# Patient Record
Sex: MALE | Race: WHITE | NOT HISPANIC OR LATINO | Employment: FULL TIME | URBAN - METROPOLITAN AREA
[De-identification: names, ages, dates, MRNs, and addresses within clinical notes are randomized per-mention and may not be internally consistent; named-entity substitution may affect disease eponyms.]

---

## 2019-01-06 ENCOUNTER — OFFICE VISIT (OUTPATIENT)
Dept: URGENT CARE | Facility: CLINIC | Age: 49
End: 2019-01-06
Payer: COMMERCIAL

## 2019-01-06 VITALS
HEART RATE: 77 BPM | OXYGEN SATURATION: 100 % | SYSTOLIC BLOOD PRESSURE: 114 MMHG | DIASTOLIC BLOOD PRESSURE: 60 MMHG | HEIGHT: 74 IN | RESPIRATION RATE: 18 BRPM | TEMPERATURE: 98.4 F | BODY MASS INDEX: 27.08 KG/M2 | WEIGHT: 211 LBS

## 2019-01-06 DIAGNOSIS — R13.10 DYSPHAGIA, UNSPECIFIED TYPE: ICD-10-CM

## 2019-01-06 DIAGNOSIS — M54.2 NECK PAIN: ICD-10-CM

## 2019-01-06 PROCEDURE — 99205 OFFICE O/P NEW HI 60 MIN: CPT | Performed by: PHYSICIAN ASSISTANT

## 2019-01-06 RX ORDER — NAPROXEN 500 MG/1
500 TABLET ORAL 2 TIMES DAILY WITH MEALS
Qty: 30 TAB | Refills: 0 | Status: ON HOLD | OUTPATIENT
Start: 2019-01-06 | End: 2019-01-09

## 2019-01-06 RX ORDER — KETOROLAC TROMETHAMINE 30 MG/ML
60 INJECTION, SOLUTION INTRAMUSCULAR; INTRAVENOUS ONCE
Status: COMPLETED | OUTPATIENT
Start: 2019-01-06 | End: 2019-01-06

## 2019-01-06 RX ORDER — CYCLOBENZAPRINE HCL 10 MG
10 TABLET ORAL 3 TIMES DAILY PRN
Qty: 30 TAB | Refills: 0 | Status: SHIPPED | OUTPATIENT
Start: 2019-01-06 | End: 2019-12-24

## 2019-01-06 RX ORDER — ACETAMINOPHEN 325 MG/1
650 TABLET ORAL EVERY 4 HOURS PRN
COMMUNITY
End: 2019-01-07

## 2019-01-06 RX ADMIN — KETOROLAC TROMETHAMINE 60 MG: 30 INJECTION, SOLUTION INTRAMUSCULAR; INTRAVENOUS at 12:21

## 2019-01-06 ASSESSMENT — ENCOUNTER SYMPTOMS
DIZZINESS: 0
PARESIS: 0
MYALGIAS: 1
DIARRHEA: 0
WEIGHT LOSS: 0
SORE THROAT: 0
FEVER: 0
NUMBNESS: 0
SENSORY CHANGE: 0
VOMITING: 0
PALPITATIONS: 0
COUGH: 0
BACK PAIN: 0
NECK PAIN: 1
CHILLS: 0
FALLS: 0
WEAKNESS: 0
SHORTNESS OF BREATH: 0
SPUTUM PRODUCTION: 0
LEG PAIN: 0
TROUBLE SWALLOWING: 0
FOCAL WEAKNESS: 0
TINGLING: 0
VISUAL CHANGE: 0
SINUS PAIN: 0
HEADACHES: 0
NAUSEA: 0
SYNCOPE: 0
ABDOMINAL PAIN: 0
WHEEZING: 0

## 2019-01-06 NOTE — PROGRESS NOTES
Subjective:      Jaquan Fernandez is a 48 y.o. male who presents with Neck Pain (denies injury, woke up with stiffness 3 days ago, worse today)            Neck Pain    This is a new problem. Episode onset: 3 days ago  The problem occurs constantly. The problem has been unchanged. The pain is associated with nothing (Patient denies any injury. ). The quality of the pain is described as aching. Pain severity now: moderate-severe. The symptoms are aggravated by swallowing, bending, position and twisting (any movement ). The pain is worse during the night. Associated symptoms include pain with swallowing. Pertinent negatives include no chest pain, fever, headaches, leg pain, numbness, paresis, syncope, tingling, trouble swallowing, visual change, weakness or weight loss. He has tried NSAIDs and acetaminophen (one muscle relaxant last night ) for the symptoms. Improvement on treatment: minimal.       No past medical history on file.    No past surgical history on file.    No family history on file.    No Known Allergies    Medications, Allergies, and current problem list reviewed today in Epic      Review of Systems   Constitutional: Negative for chills, fever, malaise/fatigue and weight loss.   HENT: Negative for congestion, ear discharge, ear pain, sinus pain, sore throat and trouble swallowing.         Pain with swallowing    Respiratory: Negative for cough, sputum production, shortness of breath and wheezing.    Cardiovascular: Negative for chest pain, palpitations, leg swelling and syncope.   Gastrointestinal: Negative for abdominal pain, diarrhea, nausea and vomiting.   Musculoskeletal: Positive for myalgias and neck pain. Negative for back pain and falls.   Neurological: Negative for dizziness, tingling, sensory change, focal weakness, weakness, numbness and headaches.     All other systems reviewed and are negative.        Objective:     /60 (BP Location: Left arm, Patient Position: Sitting)   Pulse 77   Temp  "36.9 °C (98.4 °F) (Temporal)   Resp 18   Ht 1.88 m (6' 2\")   Wt 95.7 kg (211 lb)   SpO2 100%   BMI 27.09 kg/m²      Physical Exam   Constitutional: He is oriented to person, place, and time. He appears well-developed and well-nourished. He appears distressed (patient appears to be in pain ).   HENT:   Head: Normocephalic and atraumatic.   Right Ear: Tympanic membrane, external ear and ear canal normal.   Left Ear: Tympanic membrane, external ear and ear canal normal.   Nose: Nose normal.   Mouth/Throat: Uvula is midline, oropharynx is clear and moist and mucous membranes are normal. No tonsillar exudate.   Eyes: Conjunctivae are normal.   Neck: Neck supple.   Cardiovascular: Normal rate, regular rhythm and normal heart sounds.  Exam reveals no gallop and no friction rub.    No murmur heard.  Pulmonary/Chest: Effort normal and breath sounds normal. No respiratory distress. He has no decreased breath sounds. He has no wheezes. He has no rhonchi. He has no rales.   Lymphadenopathy:     He has no cervical adenopathy.   Neurological: He is alert and oriented to person, place, and time. No cranial nerve deficit.   Skin: Skin is warm and dry. No rash noted.   Psychiatric: He has a normal mood and affect. His behavior is normal. Judgment and thought content normal.         CT C-SPINE  Impression       No acute fracture cervical spine.  Multilevel degenerative changes. Moderate right C3-4 neural foraminal stenosis.  Soft tissue calcifications anterior to the dens and caudad to the anterior mass of C1 as well as prevertebral soft tissue thickening. This may be related to inflammatory changes. Recommend consideration MRI for further evaluation.          Assessment/Plan:     1. Neck pain  ketorolac (TORADOL) injection 60 mg    naproxen (NAPROSYN) 500 MG Tab    cyclobenzaprine (FLEXERIL) 10 MG Tab    CT-CSPINE WITHOUT PLUS RECONS   2. Dysphagia, unspecified type          PATIENT SEEN ON 1/6/19 with 3 day onset of acute " severe neck pain and pain with swallowing. Patient did have neck TTP on exam which was thought to be possibly musculoskeletal in origin. Discussed case with Colleague, DR. Conley. We ordered a CT of the c-spine due to symptoms of pain with swallowing.  CT showing signs of possible RETROPHARYNGEAL Abscess. Patient is doing worse today. He is in severe pain and is having more difficulty with swallowing. Due to patient's worsening condition, Recommended ER evaluation for higher level of care.  Patient agrees to drives directly  to main [facility] for further evaluation.     Discussed case with Dr. Salinas at [facility] who kindly accepted the patient.     Nori Durham P.A.-C.

## 2019-01-07 ENCOUNTER — HOSPITAL ENCOUNTER (OUTPATIENT)
Dept: RADIOLOGY | Facility: MEDICAL CENTER | Age: 49
End: 2019-01-07
Attending: PHYSICIAN ASSISTANT
Payer: COMMERCIAL

## 2019-01-07 ENCOUNTER — HOSPITAL ENCOUNTER (INPATIENT)
Facility: MEDICAL CENTER | Age: 49
LOS: 2 days | DRG: 552 | End: 2019-01-09
Attending: EMERGENCY MEDICINE | Admitting: INTERNAL MEDICINE
Payer: COMMERCIAL

## 2019-01-07 ENCOUNTER — APPOINTMENT (OUTPATIENT)
Dept: RADIOLOGY | Facility: MEDICAL CENTER | Age: 49
DRG: 552 | End: 2019-01-07
Attending: EMERGENCY MEDICINE
Payer: COMMERCIAL

## 2019-01-07 DIAGNOSIS — J39.0 RETROPHARYNGEAL ABSCESS: ICD-10-CM

## 2019-01-07 DIAGNOSIS — M54.2 NECK PAIN: ICD-10-CM

## 2019-01-07 PROBLEM — R13.10 ODYNOPHAGIA: Status: ACTIVE | Noted: 2019-01-07

## 2019-01-07 LAB
ALBUMIN SERPL BCP-MCNC: 4.2 G/DL (ref 3.2–4.9)
ALBUMIN/GLOB SERPL: 1.2 G/DL
ALP SERPL-CCNC: 73 U/L (ref 30–99)
ALT SERPL-CCNC: 14 U/L (ref 2–50)
ANION GAP SERPL CALC-SCNC: 10 MMOL/L (ref 0–11.9)
APTT PPP: 29.1 SEC (ref 24.7–36)
AST SERPL-CCNC: 11 U/L (ref 12–45)
BASOPHILS # BLD AUTO: 0.6 % (ref 0–1.8)
BASOPHILS # BLD: 0.08 K/UL (ref 0–0.12)
BILIRUB SERPL-MCNC: 0.4 MG/DL (ref 0.1–1.5)
BUN SERPL-MCNC: 13 MG/DL (ref 8–22)
CALCIUM SERPL-MCNC: 9.7 MG/DL (ref 8.5–10.5)
CHLORIDE SERPL-SCNC: 103 MMOL/L (ref 96–112)
CO2 SERPL-SCNC: 27 MMOL/L (ref 20–33)
CREAT SERPL-MCNC: 0.79 MG/DL (ref 0.5–1.4)
EOSINOPHIL # BLD AUTO: 0.12 K/UL (ref 0–0.51)
EOSINOPHIL NFR BLD: 0.8 % (ref 0–6.9)
ERYTHROCYTE [DISTWIDTH] IN BLOOD BY AUTOMATED COUNT: 42.6 FL (ref 35.9–50)
FLUAV RNA SPEC QL NAA+PROBE: NEGATIVE
FLUBV RNA SPEC QL NAA+PROBE: NEGATIVE
GLOBULIN SER CALC-MCNC: 3.4 G/DL (ref 1.9–3.5)
GLUCOSE SERPL-MCNC: 78 MG/DL (ref 65–99)
HCT VFR BLD AUTO: 42.7 % (ref 42–52)
HGB BLD-MCNC: 14.2 G/DL (ref 14–18)
IMM GRANULOCYTES # BLD AUTO: 0.04 K/UL (ref 0–0.11)
IMM GRANULOCYTES NFR BLD AUTO: 0.3 % (ref 0–0.9)
INR PPP: 1.02 (ref 0.87–1.13)
LYMPHOCYTES # BLD AUTO: 2.73 K/UL (ref 1–4.8)
LYMPHOCYTES NFR BLD: 19.3 % (ref 22–41)
MCH RBC QN AUTO: 29.8 PG (ref 27–33)
MCHC RBC AUTO-ENTMCNC: 33.3 G/DL (ref 33.7–35.3)
MCV RBC AUTO: 89.7 FL (ref 81.4–97.8)
MONOCYTES # BLD AUTO: 1 K/UL (ref 0–0.85)
MONOCYTES NFR BLD AUTO: 7.1 % (ref 0–13.4)
NEUTROPHILS # BLD AUTO: 10.21 K/UL (ref 1.82–7.42)
NEUTROPHILS NFR BLD: 71.9 % (ref 44–72)
NRBC # BLD AUTO: 0 K/UL
NRBC BLD-RTO: 0 /100 WBC
PLATELET # BLD AUTO: 338 K/UL (ref 164–446)
PMV BLD AUTO: 9.6 FL (ref 9–12.9)
POTASSIUM SERPL-SCNC: 4.2 MMOL/L (ref 3.6–5.5)
PROT SERPL-MCNC: 7.6 G/DL (ref 6–8.2)
PROTHROMBIN TIME: 13.6 SEC (ref 12–14.6)
RBC # BLD AUTO: 4.76 M/UL (ref 4.7–6.1)
S PYO AG THROAT QL: NORMAL
SIGNIFICANT IND 70042: NORMAL
SITE SITE: NORMAL
SODIUM SERPL-SCNC: 140 MMOL/L (ref 135–145)
SOURCE SOURCE: NORMAL
WBC # BLD AUTO: 14.2 K/UL (ref 4.8–10.8)

## 2019-01-07 PROCEDURE — 99223 1ST HOSP IP/OBS HIGH 75: CPT | Mod: GC | Performed by: INTERNAL MEDICINE

## 2019-01-07 PROCEDURE — 700117 HCHG RX CONTRAST REV CODE 255: Performed by: EMERGENCY MEDICINE

## 2019-01-07 PROCEDURE — 700111 HCHG RX REV CODE 636 W/ 250 OVERRIDE (IP): Performed by: EMERGENCY MEDICINE

## 2019-01-07 PROCEDURE — 99285 EMERGENCY DEPT VISIT HI MDM: CPT

## 2019-01-07 PROCEDURE — 700111 HCHG RX REV CODE 636 W/ 250 OVERRIDE (IP): Performed by: STUDENT IN AN ORGANIZED HEALTH CARE EDUCATION/TRAINING PROGRAM

## 2019-01-07 PROCEDURE — 85730 THROMBOPLASTIN TIME PARTIAL: CPT

## 2019-01-07 PROCEDURE — 85025 COMPLETE CBC W/AUTO DIFF WBC: CPT

## 2019-01-07 PROCEDURE — 70491 CT SOFT TISSUE NECK W/DYE: CPT

## 2019-01-07 PROCEDURE — 87081 CULTURE SCREEN ONLY: CPT

## 2019-01-07 PROCEDURE — 87040 BLOOD CULTURE FOR BACTERIA: CPT | Mod: 91

## 2019-01-07 PROCEDURE — 96375 TX/PRO/DX INJ NEW DRUG ADDON: CPT

## 2019-01-07 PROCEDURE — 87502 INFLUENZA DNA AMP PROBE: CPT

## 2019-01-07 PROCEDURE — 72125 CT NECK SPINE W/O DYE: CPT

## 2019-01-07 PROCEDURE — 96365 THER/PROPH/DIAG IV INF INIT: CPT

## 2019-01-07 PROCEDURE — 80053 COMPREHEN METABOLIC PANEL: CPT

## 2019-01-07 PROCEDURE — 96366 THER/PROPH/DIAG IV INF ADDON: CPT

## 2019-01-07 PROCEDURE — 770001 HCHG ROOM/CARE - MED/SURG/GYN PRIV*

## 2019-01-07 PROCEDURE — 36415 COLL VENOUS BLD VENIPUNCTURE: CPT

## 2019-01-07 PROCEDURE — 87880 STREP A ASSAY W/OPTIC: CPT

## 2019-01-07 PROCEDURE — 96367 TX/PROPH/DG ADDL SEQ IV INF: CPT

## 2019-01-07 PROCEDURE — 700105 HCHG RX REV CODE 258: Performed by: STUDENT IN AN ORGANIZED HEALTH CARE EDUCATION/TRAINING PROGRAM

## 2019-01-07 PROCEDURE — 94760 N-INVAS EAR/PLS OXIMETRY 1: CPT

## 2019-01-07 PROCEDURE — 700105 HCHG RX REV CODE 258: Performed by: EMERGENCY MEDICINE

## 2019-01-07 PROCEDURE — 85610 PROTHROMBIN TIME: CPT

## 2019-01-07 RX ORDER — CLINDAMYCIN PHOSPHATE 600 MG/50ML
600 INJECTION, SOLUTION INTRAVENOUS ONCE
Status: DISCONTINUED | OUTPATIENT
Start: 2019-01-07 | End: 2019-01-07

## 2019-01-07 RX ORDER — SODIUM CHLORIDE 9 MG/ML
INJECTION, SOLUTION INTRAVENOUS CONTINUOUS
Status: DISCONTINUED | OUTPATIENT
Start: 2019-01-07 | End: 2019-01-09 | Stop reason: HOSPADM

## 2019-01-07 RX ORDER — ONDANSETRON 2 MG/ML
4 INJECTION INTRAMUSCULAR; INTRAVENOUS ONCE
Status: COMPLETED | OUTPATIENT
Start: 2019-01-07 | End: 2019-01-07

## 2019-01-07 RX ORDER — AMOXICILLIN 250 MG
2 CAPSULE ORAL 2 TIMES DAILY
Status: DISCONTINUED | OUTPATIENT
Start: 2019-01-07 | End: 2019-01-09 | Stop reason: HOSPADM

## 2019-01-07 RX ORDER — COVID-19 ANTIGEN TEST
2 KIT MISCELLANEOUS
Status: ON HOLD | COMMUNITY
End: 2019-01-09

## 2019-01-07 RX ORDER — SODIUM CHLORIDE 9 MG/ML
1000 INJECTION, SOLUTION INTRAVENOUS ONCE
Status: COMPLETED | OUTPATIENT
Start: 2019-01-07 | End: 2019-01-07

## 2019-01-07 RX ORDER — MORPHINE SULFATE 4 MG/ML
4 INJECTION, SOLUTION INTRAMUSCULAR; INTRAVENOUS ONCE
Status: COMPLETED | OUTPATIENT
Start: 2019-01-07 | End: 2019-01-07

## 2019-01-07 RX ORDER — MORPHINE SULFATE 4 MG/ML
4 INJECTION, SOLUTION INTRAMUSCULAR; INTRAVENOUS
Status: DISCONTINUED | OUTPATIENT
Start: 2019-01-07 | End: 2019-01-08

## 2019-01-07 RX ORDER — POLYETHYLENE GLYCOL 3350 17 G/17G
1 POWDER, FOR SOLUTION ORAL
Status: DISCONTINUED | OUTPATIENT
Start: 2019-01-07 | End: 2019-01-09 | Stop reason: HOSPADM

## 2019-01-07 RX ORDER — IBUPROFEN 200 MG
400 TABLET ORAL
COMMUNITY

## 2019-01-07 RX ORDER — BISACODYL 10 MG
10 SUPPOSITORY, RECTAL RECTAL
Status: DISCONTINUED | OUTPATIENT
Start: 2019-01-07 | End: 2019-01-09 | Stop reason: HOSPADM

## 2019-01-07 RX ORDER — MORPHINE SULFATE 4 MG/ML
2 INJECTION, SOLUTION INTRAMUSCULAR; INTRAVENOUS
Status: DISCONTINUED | OUTPATIENT
Start: 2019-01-07 | End: 2019-01-07

## 2019-01-07 RX ORDER — DEXAMETHASONE SODIUM PHOSPHATE 4 MG/ML
4 INJECTION, SOLUTION INTRA-ARTICULAR; INTRALESIONAL; INTRAMUSCULAR; INTRAVENOUS; SOFT TISSUE EVERY 6 HOURS
Status: DISCONTINUED | OUTPATIENT
Start: 2019-01-07 | End: 2019-01-08

## 2019-01-07 RX ADMIN — SODIUM CHLORIDE 3 G: 900 INJECTION INTRAVENOUS at 22:22

## 2019-01-07 RX ADMIN — VANCOMYCIN HYDROCHLORIDE 2400 MG: 100 INJECTION, POWDER, LYOPHILIZED, FOR SOLUTION INTRAVENOUS at 17:06

## 2019-01-07 RX ADMIN — DEXAMETHASONE SODIUM PHOSPHATE 4 MG: 4 INJECTION, SOLUTION INTRA-ARTICULAR; INTRALESIONAL; INTRAMUSCULAR; INTRAVENOUS; SOFT TISSUE at 22:11

## 2019-01-07 RX ADMIN — SODIUM CHLORIDE 3 G: 900 INJECTION INTRAVENOUS at 16:29

## 2019-01-07 RX ADMIN — SODIUM CHLORIDE 1000 ML: 9 INJECTION, SOLUTION INTRAVENOUS at 15:10

## 2019-01-07 RX ADMIN — ONDANSETRON 4 MG: 2 INJECTION INTRAMUSCULAR; INTRAVENOUS at 15:10

## 2019-01-07 RX ADMIN — IOHEXOL 80 ML: 350 INJECTION, SOLUTION INTRAVENOUS at 15:41

## 2019-01-07 RX ADMIN — MORPHINE SULFATE 2 MG: 4 INJECTION INTRAVENOUS at 21:53

## 2019-01-07 RX ADMIN — MORPHINE SULFATE 4 MG: 4 INJECTION INTRAVENOUS at 15:10

## 2019-01-07 RX ADMIN — SODIUM CHLORIDE: 9 INJECTION, SOLUTION INTRAVENOUS at 22:07

## 2019-01-07 ASSESSMENT — ENCOUNTER SYMPTOMS
MYALGIAS: 0
DEPRESSION: 0
PALPITATIONS: 0
BLURRED VISION: 0
COUGH: 0
CHILLS: 0
HEMOPTYSIS: 0
NAUSEA: 0
NECK PAIN: 1
DOUBLE VISION: 0
HEARTBURN: 0
DIZZINESS: 0
FEVER: 0
HEADACHES: 0
SORE THROAT: 0

## 2019-01-07 ASSESSMENT — COPD QUESTIONNAIRES
DO YOU EVER COUGH UP ANY MUCUS OR PHLEGM?: NO/ONLY WITH OCCASIONAL COLDS OR INFECTIONS
DURING THE PAST 4 WEEKS HOW MUCH DID YOU FEEL SHORT OF BREATH: NONE/LITTLE OF THE TIME
COPD SCREENING SCORE: 0
HAVE YOU SMOKED AT LEAST 100 CIGARETTES IN YOUR ENTIRE LIFE: NO/DON'T KNOW

## 2019-01-07 ASSESSMENT — LIFESTYLE VARIABLES: EVER_SMOKED: NEVER

## 2019-01-07 ASSESSMENT — PAIN SCALES - GENERAL
PAINLEVEL_OUTOF10: 10
PAINLEVEL_OUTOF10: 6

## 2019-01-07 NOTE — ED TRIAGE NOTES
Pt to triage .  Chief Complaint   Patient presents with   • Sent by MD     pt was seen at u/c yesterday had a CT this am and called by radiologist and told to come to ED for further evaluation    • Neck Pain     increasing over last 5 days   • Difficulty Swallowing

## 2019-01-07 NOTE — ED PROVIDER NOTES
ED Provider Note    Scribed for Murali Willson M.D. by Zenoiba Rose. 1/7/2019  2:29 PM    Primary care provider: Marshall Jimenez M.D.  Means of arrival: Walk in   History obtained from: Patient  History limited by: None    CHIEF COMPLAINT  Chief Complaint   Patient presents with   • Sent by MD     pt was seen at / yesterday had a CT this am and called by radiologist and told to come to ED for further evaluation    • Neck Pain     increasing over last 5 days   • Difficulty Swallowing       HPI  Jaquan Fernandez is a 48 y.o. male who presents to the Emergency Department complaining of neck stiffness onset 4 days ago with worsening symptoms. He reports the stiffness initially began in his shoulders and is now radiating to the base of his skull. Patient also states having associated difficulty swallowing. Patient presented to Urgent Care this morning and was sent to this ED with concerns for . He denies shortness of breath, sore throat, fever, chills, numbness, or tingling.     REVIEW OF SYSTEMS  Pertinent negatives include no shortness of breath, sore throat, fever, chills, numbness, tingling.   As above, all other systems reviewed and are negative.   See HPI for further details.     PAST MEDICAL HISTORY  Negative for history of sepsis or immunosuppression.     SURGICAL HISTORY  patient denies any surgical history    SOCIAL HISTORY  Social History   Substance Use Topics   • Smoking status: Never Smoker   • Smokeless tobacco: Never Used   • Alcohol use Yes      History   Drug Use No       FAMILY HISTORY  History reviewed. No pertinent family history.    CURRENT MEDICATIONS  Home Medications     Reviewed by Lilli Yang (Pharmacy Tech) on 01/07/19 at 1530  Med List Status: Complete   Medication Last Dose Status   Acetaminophen (TYLENOL PO) 1/6/2019 Active   cyclobenzaprine (FLEXERIL) 10 MG Tab 1/7/2019 Active   ibuprofen (MOTRIN) 200 MG Tab 1/6/2019 Active   naproxen (NAPROSYN) 500 MG Tab 1/7/2019 Active  "  Naproxen Sodium (ALEVE) 220 MG Cap 1/6/2019 Active                ALLERGIES  No Known Allergies    PHYSICAL EXAM  VITAL SIGNS: /89   Pulse 78   Temp 36.4 °C (97.6 °F) (Temporal)   Resp 16   Ht 1.88 m (6' 2\")   Wt 95.3 kg (210 lb 1.6 oz)   SpO2 98%   BMI 26.98 kg/m²     Constitutional: Well developed, Well nourished, No acute distress, Non-toxic appearance.   HENT: Normocephalic, Atraumatic, Bilateral external ears normal, Oropharynx is clear, Dry mucous membranes. No oral exudates or nasal discharge. No uvular deviation or peritonsillar swelling noted.    Eyes: Pupils are equal round and reactive, EOMI, Conjunctiva normal, No discharge.   Neck: Normal range of motion, No tenderness, Supple, No stridor. No meningismus.  Lymphatic: No lymphadenopathy noted.   Cardiovascular: Regular rate and rhythm without murmur rub or gallop.  Thorax & Lungs: Ketotic breath, Clear breath sounds, No wheezes, rhonchi or rales. There is no chest wall tenderness.   Abdomen: Soft non-tender non-distended. There is no rebound or guarding. No organomegaly is appreciated. Bowel sounds are normal.  Skin: Normal without rash.   Back: No CVA or spinal tenderness.   Extremities: Intact distal pulses, No edema, No tenderness, No cyanosis, No clubbing. Capillary refill is less than 2 seconds.  Musculoskeletal: Good range of motion in all major joints. No tenderness to palpation or major deformities noted.   Neurologic: Alert & oriented x 3, Normal motor function, Normal sensory function, No focal deficits noted. Reflexes are normal.  Psychiatric: Affect normal, Judgment normal, Mood normal. There is no suicidal ideation or patient reported hallucinations.     DIAGNOSTIC STUDIES / PROCEDURES    LABS  Labs Reviewed   CBC WITH DIFFERENTIAL - Abnormal; Notable for the following:        Result Value    WBC 14.2 (*)     MCHC 33.3 (*)     Lymphocytes 19.30 (*)     Neutrophils (Absolute) 10.21 (*)     Monos (Absolute) 1.00 (*)     All " other components within normal limits    Narrative:     Indicate which anticoagulants the patient is on:->NONE   COMP METABOLIC PANEL - Abnormal; Notable for the following:     AST(SGOT) 11 (*)     All other components within normal limits    Narrative:     Indicate which anticoagulants the patient is on:->NONE   PROTHROMBIN TIME    Narrative:     Indicate which anticoagulants the patient is on:->NONE   APTT    Narrative:     Indicate which anticoagulants the patient is on:->NONE   ESTIMATED GFR    Narrative:     Indicate which anticoagulants the patient is on:->NONE   RAPID STREP,CULT IF INDICATED   All labs reviewed by me.    RADIOLOGY  CT-SOFT TISSUE NECK WITH   Final Result      Diffuse edema within the retropharyngeal space extending from C2 to the upper thoracic spine, most concerning for inflammatory process and developing abscess.      These findings were discussed with BLANK SANON on 1/7/2019 3:49 PM.         The radiologist's interpretation of all radiological studies have been reviewed by me.    COURSE & MEDICAL DECISION MAKING  Nursing notes, VS, PMSFHx reviewed in chart.    Review of past medical records shows the patient was seen in Urgent Care and underwent CT C spine after presenting with difficulty swallowing.     2:29 PM Patient seen and examined at bedside. I have a high suspicion for a retropharyngeal abscess that is unable to be evaluation with the previous CT scan completed this morning. Low concerns for C spine process given that he does not have any neurological changes. Ordered for CT soft tissue neck and labs to evaluate. Patient was treated with 4 mg Zofran and 4 mg morphine for his symptoms. He will be given IV fluids secondary to dehydration. Patient was not given oral fluids as he is having difficulty swallowing.    Patient has leukocytosis at 14,000 with lymphocytic shift and no evidence of electrolyte derangements, acidosis, renal or liver dysfunction.  Coagulation studies are  normal.    3:49 PM Consulted radiology who states patient had a retropharyngeal phelgmon and is concerned it may progress to an abscess. Patient will be admitted for further treatment.     3:52 PM Paged [facility].     3:59 PM Patient reevaluated at bedside. He is resting in bed. Patient is showing good response to rehydration with IV fluids. Discussed results as shown above with patient and family member. They were informed of of further plan of care including hospital admission. Patient will be treated with 2,400 mg vancomycin in  mL and 3 g Unasyn in  mL.     4:14 PM Consulted [facility], who agreed to admit patient.  They will obtain ENT consultation on a nonemergent basis    DISPOSITION:  Patient will be admitted to [facility] in guarded condition.     FINAL IMPRESSION  1. Retropharyngeal abscess       Zenobia BERNSTEIN (Avril), am scribing for, and in the presence of, Murali Willson M.D..  Electronically signed by: Zenobia Rose (Avril), 1/7/2019  Murali BERNSTEIN M.D. personally performed the services described in this documentation, as scribed by Zenobia Rose in my presence, and it is both accurate and complete. C.     The note accurately reflects work and decisions made by me.  Murali Willson  1/7/2019  4:22 PM

## 2019-01-08 LAB
ALBUMIN SERPL BCP-MCNC: 4 G/DL (ref 3.2–4.9)
ALBUMIN/GLOB SERPL: 1.4 G/DL
ALP SERPL-CCNC: 73 U/L (ref 30–99)
ALT SERPL-CCNC: 9 U/L (ref 2–50)
ANION GAP SERPL CALC-SCNC: 11 MMOL/L (ref 0–11.9)
AST SERPL-CCNC: 10 U/L (ref 12–45)
BASOPHILS # BLD AUTO: 0.2 % (ref 0–1.8)
BASOPHILS # BLD: 0.03 K/UL (ref 0–0.12)
BILIRUB SERPL-MCNC: 0.5 MG/DL (ref 0.1–1.5)
BUN SERPL-MCNC: 13 MG/DL (ref 8–22)
CALCIUM SERPL-MCNC: 9.3 MG/DL (ref 8.5–10.5)
CHLORIDE SERPL-SCNC: 106 MMOL/L (ref 96–112)
CO2 SERPL-SCNC: 23 MMOL/L (ref 20–33)
CREAT SERPL-MCNC: 0.69 MG/DL (ref 0.5–1.4)
EOSINOPHIL # BLD AUTO: 0.01 K/UL (ref 0–0.51)
EOSINOPHIL NFR BLD: 0.1 % (ref 0–6.9)
ERYTHROCYTE [DISTWIDTH] IN BLOOD BY AUTOMATED COUNT: 42.8 FL (ref 35.9–50)
GLOBULIN SER CALC-MCNC: 2.9 G/DL (ref 1.9–3.5)
GLUCOSE SERPL-MCNC: 103 MG/DL (ref 65–99)
HCT VFR BLD AUTO: 40.5 % (ref 42–52)
HGB BLD-MCNC: 13.3 G/DL (ref 14–18)
IMM GRANULOCYTES # BLD AUTO: 0.06 K/UL (ref 0–0.11)
IMM GRANULOCYTES NFR BLD AUTO: 0.4 % (ref 0–0.9)
LYMPHOCYTES # BLD AUTO: 1.2 K/UL (ref 1–4.8)
LYMPHOCYTES NFR BLD: 8.4 % (ref 22–41)
MCH RBC QN AUTO: 29.8 PG (ref 27–33)
MCHC RBC AUTO-ENTMCNC: 32.8 G/DL (ref 33.7–35.3)
MCV RBC AUTO: 90.8 FL (ref 81.4–97.8)
MONOCYTES # BLD AUTO: 0.31 K/UL (ref 0–0.85)
MONOCYTES NFR BLD AUTO: 2.2 % (ref 0–13.4)
NEUTROPHILS # BLD AUTO: 12.75 K/UL (ref 1.82–7.42)
NEUTROPHILS NFR BLD: 88.7 % (ref 44–72)
NRBC # BLD AUTO: 0 K/UL
NRBC BLD-RTO: 0 /100 WBC
PLATELET # BLD AUTO: 321 K/UL (ref 164–446)
PMV BLD AUTO: 10.2 FL (ref 9–12.9)
POTASSIUM SERPL-SCNC: 4.2 MMOL/L (ref 3.6–5.5)
PROT SERPL-MCNC: 6.9 G/DL (ref 6–8.2)
RBC # BLD AUTO: 4.46 M/UL (ref 4.7–6.1)
SODIUM SERPL-SCNC: 140 MMOL/L (ref 135–145)
VANCOMYCIN TROUGH SERPL-MCNC: 10.6 UG/ML (ref 10–20)
WBC # BLD AUTO: 14.4 K/UL (ref 4.8–10.8)

## 2019-01-08 PROCEDURE — 700102 HCHG RX REV CODE 250 W/ 637 OVERRIDE(OP): Performed by: STUDENT IN AN ORGANIZED HEALTH CARE EDUCATION/TRAINING PROGRAM

## 2019-01-08 PROCEDURE — 700105 HCHG RX REV CODE 258: Performed by: STUDENT IN AN ORGANIZED HEALTH CARE EDUCATION/TRAINING PROGRAM

## 2019-01-08 PROCEDURE — 36415 COLL VENOUS BLD VENIPUNCTURE: CPT

## 2019-01-08 PROCEDURE — 770001 HCHG ROOM/CARE - MED/SURG/GYN PRIV*

## 2019-01-08 PROCEDURE — 80053 COMPREHEN METABOLIC PANEL: CPT

## 2019-01-08 PROCEDURE — 80202 ASSAY OF VANCOMYCIN: CPT

## 2019-01-08 PROCEDURE — 700111 HCHG RX REV CODE 636 W/ 250 OVERRIDE (IP): Performed by: STUDENT IN AN ORGANIZED HEALTH CARE EDUCATION/TRAINING PROGRAM

## 2019-01-08 PROCEDURE — A9270 NON-COVERED ITEM OR SERVICE: HCPCS | Performed by: STUDENT IN AN ORGANIZED HEALTH CARE EDUCATION/TRAINING PROGRAM

## 2019-01-08 PROCEDURE — 85025 COMPLETE CBC W/AUTO DIFF WBC: CPT

## 2019-01-08 PROCEDURE — 99232 SBSQ HOSP IP/OBS MODERATE 35: CPT | Mod: GC | Performed by: INTERNAL MEDICINE

## 2019-01-08 RX ORDER — ACETAMINOPHEN 500 MG
500 TABLET ORAL EVERY 6 HOURS PRN
Status: DISCONTINUED | OUTPATIENT
Start: 2019-01-08 | End: 2019-01-09 | Stop reason: HOSPADM

## 2019-01-08 RX ORDER — DEXAMETHASONE SODIUM PHOSPHATE 4 MG/ML
4 INJECTION, SOLUTION INTRA-ARTICULAR; INTRALESIONAL; INTRAMUSCULAR; INTRAVENOUS; SOFT TISSUE EVERY 8 HOURS
Status: DISCONTINUED | OUTPATIENT
Start: 2019-01-08 | End: 2019-01-09 | Stop reason: HOSPADM

## 2019-01-08 RX ORDER — CYCLOBENZAPRINE HCL 10 MG
10 TABLET ORAL 3 TIMES DAILY
Status: DISCONTINUED | OUTPATIENT
Start: 2019-01-08 | End: 2019-01-09 | Stop reason: HOSPADM

## 2019-01-08 RX ADMIN — SODIUM CHLORIDE 3 G: 900 INJECTION INTRAVENOUS at 21:36

## 2019-01-08 RX ADMIN — SODIUM CHLORIDE 3 G: 900 INJECTION INTRAVENOUS at 17:05

## 2019-01-08 RX ADMIN — CYCLOBENZAPRINE 10 MG: 10 TABLET, FILM COATED ORAL at 10:58

## 2019-01-08 RX ADMIN — CYCLOBENZAPRINE 10 MG: 10 TABLET, FILM COATED ORAL at 06:41

## 2019-01-08 RX ADMIN — SODIUM CHLORIDE: 9 INJECTION, SOLUTION INTRAVENOUS at 21:34

## 2019-01-08 RX ADMIN — DEXAMETHASONE SODIUM PHOSPHATE 4 MG: 4 INJECTION, SOLUTION INTRA-ARTICULAR; INTRALESIONAL; INTRAMUSCULAR; INTRAVENOUS; SOFT TISSUE at 04:44

## 2019-01-08 RX ADMIN — STANDARDIZED SENNA CONCENTRATE AND DOCUSATE SODIUM 2 TABLET: 8.6; 5 TABLET, FILM COATED ORAL at 17:05

## 2019-01-08 RX ADMIN — SODIUM CHLORIDE 3 G: 900 INJECTION INTRAVENOUS at 10:52

## 2019-01-08 RX ADMIN — DEXAMETHASONE SODIUM PHOSPHATE 4 MG: 4 INJECTION, SOLUTION INTRA-ARTICULAR; INTRALESIONAL; INTRAMUSCULAR; INTRAVENOUS; SOFT TISSUE at 21:38

## 2019-01-08 RX ADMIN — ACETAMINOPHEN 500 MG: 500 TABLET ORAL at 17:05

## 2019-01-08 RX ADMIN — SODIUM CHLORIDE 3 G: 900 INJECTION INTRAVENOUS at 04:42

## 2019-01-08 RX ADMIN — ACETAMINOPHEN 500 MG: 500 TABLET ORAL at 10:53

## 2019-01-08 RX ADMIN — VANCOMYCIN HYDROCHLORIDE 1100 MG: 100 INJECTION, POWDER, LYOPHILIZED, FOR SOLUTION INTRAVENOUS at 00:01

## 2019-01-08 RX ADMIN — DEXAMETHASONE SODIUM PHOSPHATE 4 MG: 4 INJECTION, SOLUTION INTRA-ARTICULAR; INTRALESIONAL; INTRAMUSCULAR; INTRAVENOUS; SOFT TISSUE at 10:52

## 2019-01-08 RX ADMIN — CYCLOBENZAPRINE 10 MG: 10 TABLET, FILM COATED ORAL at 17:05

## 2019-01-08 ASSESSMENT — PAIN SCALES - GENERAL
PAINLEVEL_OUTOF10: 3
PAINLEVEL_OUTOF10: 5
PAINLEVEL_OUTOF10: 2
PAINLEVEL_OUTOF10: 3

## 2019-01-08 NOTE — NON-PROVIDER
Internal Medicine Medical Student Note  Note Author: Salima Azevedo, Student    Name Jaquan Fernandez     1970   Age/Sex 48 y.o. male   MRN [id number]   Code Status Full Code      Reason for interval visit  (Principal Problem)   Neck pain    Interval Problem Daily Status Update  (problem status, last 24 hours, new history, new data )   Pt has received 1 dose of IV vancomycin in the ED yesterday and had his second dose of unasyn early this AM. Overnight he was experiencing severe 10/10 pain, IV morphine was given, but pt cont to complain of intolerable pain. This morning he has 3/10 and attributes this to finding a comfortable position and not moving. He was made NPO yesterday, but after a bedside swallow exam, he has been changed to full liquids to progress as tolerated. ENT saw this patient and believes the pain to be of musculoskeletal origin. However, she believes cont of ABs and steroids to be appropriate. She also recommends continuing muscle relaxant.     Physical Exam  Gen: Well nourished male laying uncomfortably in bed   Head: Normocephalic, atraumatic   Neck: No LAD, diffuse tenderness along posterior aspect of neck  Card: RRR, no murmurs appreciated  Pulm: Normal respiratory effort, clear bilaterally  GI: Normoactive BS, NT, ND     Vitals:    19 0000 19 0008 19 0400 19 0438   BP:  124/71 122/70    Pulse: 69 72 74 73   Resp:  20 20    Temp:  37.1 °C (98.7 °F) 36.6 °C (97.8 °F)    TempSrc:       SpO2: 93% 95% 95% 93%   Weight:       Height:         Body mass index is 26.98 kg/m². Weight: 95.3 kg (210 lb 1.6 oz)  Oxygen Therapy:  Pulse Oximetry: 93 %, O2 (LPM): 1.5, FiO2%: 21 %, O2 Delivery: Nasal Cannula    Physical Exam     Assessment/Plan    Neck pain: Retropharyngeal infection versus infectious esophagitis versus musculoskeletal versus radicular. Given leukocytosis (14.2), recent sick contact, CT findings of diffuse retropharyngeal edema and potential complications of  this infection (acute necrotizing mediastinitis), suspicion for retropharyngeal infection remains high. ENT believes this to be of musculoskeletal origin. Unlikely of radicular origin given no associated paresthesias/weakness, no radiation of pain to arms.   -Discontinued IV vancomycin  -On unasyn (today is day 2)  -Diet changed to full liquids, to progress as tolerated  -Consider d/c on oral AB (augmetin) and oral steroids when diet has progressed satisfactorily     Salima Azevedo, MS3

## 2019-01-08 NOTE — PROGRESS NOTES
Dr. Taylor of [facility] notified that patient was made NPO: strict, by Dr. Flor. Dr. Taylor also notified that a new order for oral flexeril was put in for 0630. New order received:    Do a bedside swallow eval and give oral flexeril.

## 2019-01-08 NOTE — NON-PROVIDER
Internal Medicine Medical Student Admitting History and Physical  Note Author: Salima Azevedo, Student    Name Jaquan Fernandez     1970   Age/Sex 48 y.o. male   MRN [id number]   Code Status Full Code      Chief Complaint:  Neck pain and difficulty swallowing    HPI: This is a 49yo male who presents to the ED with complaint of neck pain that started when he woke up on Thursday 1/3. He describes the pain as a stiffness beginning at the base of his neck and continuing to the base of his skull. He denies radiation of pain or weakness in the UE. Since Thursday it has progressively worsened to a 10/10 pain and has been associated with odynophagia, causing him to seek medical attention at the urgent care on . There they gave him naproxen, a muscle relaxant, and an injection of which he cannot remember the name. He has also tried ibuprofen and tylenol, however none of these measures has improved the pain. It seems that finding a comfortable position and sitting still is the only thing that alleviates his pain. The urgent care physician recommended CT for further w/u. This could not be done until this AM. CT demonstrated diffuse edema in retropharyngeal space from C2 to upper thoracic spine. This was concerning for inflammatory process and potential abscess formation. Upon reading imaging, radiologist recommended Mr. Fernandez to come to the ED for further evaluation. The pt denies fever, but has had recent sick contact--mother was influenza B PCR+ just before [place], treated with tamiflu. Also denies h/o penetrating trauma during meals or tooth infection and regularly sees dentist, as recently as 3-4mo ago.     ROS  Gen: Denies weight loss, fevers/chills, N/V, subjective fever   Card: Denies palpitations, chest pain   Pulm: Dry cough since late November, no SOB  GI: Denies diarrhea/constipation   : Denies dysuria, urinary frequency   Neuro: Denies HA, changes in vision, weakness/paresthesia        "  Past Medical History (chronic problems, known complications, current management)     No chronic problems, no medications     Past Surgical History:  Remote tonsillectomy and ortho surg ([facility])    Medication Allergy/Sensitivities:  NKDA    Family History:  Unknown, adopted     Social History:  Smoking: Denies  Alcohol: 5-6 beers/d on weekends  Illictis: Denies  Other: n/a  Living situation: With wife     Physical Exam:  Gen: Well nourished male laying uncomfortably in bed   Head: Normocephalic, atraumatic   Neck: No LAD, diffuse tenderness along posterior aspect of neck  Card: RRR, no murmurs appreciated  Pulm: Normal respiratory effort, clear bilaterally  GI: Normoactive BS, NT, ND       Vitals:    01/07/19 1541 01/07/19 1549 01/07/19 1600 01/07/19 1630   BP:       Pulse: 69 (!) 54 79 70   Resp:       Temp:       TempSrc:       SpO2: 93% 100% 100% 100%   Weight:       Height:         Body mass index is 26.98 kg/m².  /89   Pulse 70   Temp 36.4 °C (97.6 °F) (Temporal)   Resp 16   Ht 1.88 m (6' 2\")   Wt 95.3 kg (210 lb 1.6 oz)   SpO2 100%   BMI 26.98 kg/m²   O2 therapy: Pulse Oximetry: 100 %, O2 Delivery: None (Room Air)     1/7 Labs:  -Leukocytosis (14.2) with elevated neutrophils and monocytes   -Negative for group A strep (rapid strep)    Physical Exam    Assessment/Plan   Neck pain: Retropharyngeal infection versus infectious esophagitis versus musculoskeletal versus radicular. Given leukocytosis (14.2), recent sick contact, CT findings of diffuse retropharyngeal edema and potential complications of this infection (acute necrotizing mediastinitis), suspicion for retropharyngeal infection remains high. Unlikely of musculoskeletal origin given no h/o trauma, unlikely of radicular origin given no associated paresthesias/weakness, no radiation of pain to arms.   -Cont empiric tx of IV vancomycin, consider adding unasyn for gram- coverage  -Consult ENT (loculated abscess requiring drainage VS delaye " drainage if in cellulitis phase)  -Consider NPO status--in the case of retropharyngeal infection, PO intake should be restricted until any swallowing impairment has resolved completely  -R/o infectious esophagitis (due to CMV, EBV, HSV, candida) via endoscopy    Salima Azevedo, MS3

## 2019-01-08 NOTE — ASSESSMENT & PLAN NOTE
"Pt is a 47 YO male presented with worsening neck pain with odynophagia(solids and liquids)   -Pt was sent by Urgent Care MD due to abnormal findings on CT  -CT scan showed possible Retropharyngeal abscess extending from C2 to the upper thoracic spine, \"most concerning for inflammatory process and developing abscess.\"  -WBC 14  -Pt denies SOB, fever, chills, trouble breathing  -Cause of abscess is unclear. Possible dental source. Pt reports getting his teeth cleaned twice/year with good oral care. I was able to appreciate darkened teeth in the posterior. Cavities?  -Denies any oral pain    Plan  ENT was consulted: Unlikely to be Retropharyngeal abscess. Likely to be \"musculoskeletal given the tenderness on the back of the neck.\"  -Tapering down Decadron  -TID Flexeril  -Advancing diet as tolerated  -Likely discharge tomorrow  -Continue Unasyn  -Discontinued Vanco      "

## 2019-01-08 NOTE — PROGRESS NOTES
"Pharmacy Kinetics 48 y.o. male on vancomycin day # 1 2019    Currently on Vancomycin 2400 mg iv loading dose    Indication for Treatment: SSTI    Pertinent history per medical record: Admitted on 2019 for retropharyngeal abscess. Pt presents to ED complaining of worsening neck pain that started on 1/3/19. CT of neck today shows diffuse edema within the retropharyngeal space most concerning for inflammatory process and developing abscess. Unasyn and vancomycin empirically initiated for retropharyngeal abscess.    Other antibiotics: Unasyn 3g IV Q6H    Allergies: Patient has no known allergies.     List concerns for renal function: None identified    Pertinent cultures to date:   pending    MRSA nares swab if pneumonia is a concern (ordered/positive/negative/n-a): N/a    Recent Labs      19   1507   WBC  14.2*   NEUTSPOLYS  71.90     Recent Labs      19   1507   BUN  13   CREATININE  0.79   ALBUMIN  4.2     No results for input(s): VANCOTROUGH, VANCOPEAK, VANCORANDOM in the last 72 hours.No intake or output data in the 24 hours ending 19 1821   Blood pressure 136/89, pulse (P) 60, temperature 36.4 °C (97.6 °F), temperature source Temporal, resp. rate 16, height 1.88 m (6' 2\"), weight 95.3 kg (210 lb 1.6 oz), SpO2 (P) 100 %. Temp (24hrs), Av.4 °C (97.6 °F), Min:36.4 °C (97.6 °F), Max:36.4 °C (97.6 °F)      A/P   1. Vancomycin dose change: 1100 mg IV Q8H  2. Next vancomycin level: VT prior to 4th dose (level ordered for 19 at 1630)  3. Goal trough: 12-16 mcg/mL  4. Comments: No concerns for accumulation. No history of vancomycin administration at [facility]. Will dose per pharmacy protocol and check vancomycin trough prior to 4th dose. Clinical pharmacist to f/u and evaluate tomorrow. Look to de-escalate pending cultures.    Daren Moore, PharmD      "

## 2019-01-08 NOTE — CONSULTS
DATE OF SERVICE:  01/07/2019    HISTORY OF PRESENT ILLNESS:  This is a 48-year-old male with a couple days of   neck pain and increasing difficulty swallowing.  He comes today in the ER, he   states he just woke up with really bad neck pain which has started on   Thursday, 01/03 has continuously gotten worse.  He was seen, his doctor   recommend a CT scan, which was done and then recommended he come to the ER   secondary to this concern of retropharyngeal space edema.    PAST MEDICAL HISTORY:  His past medical history is otherwise noncontributory.    MEDICATIONS:  None.    PAST SURGICAL HISTORY:  He has had a tonsillectomy, ortho surgery.    ALLERGIES:  No known drug allergy.    PHYSICAL EXAMINATION:  GENERAL:  He is lying in the bed, in no distress.  VITAL SIGNS:  Satting in the high 90s.  Pulse of 60, currently afebrile.  HEENT:  The right ear is packed with wax.  The left ear is clear.  The nose   shows a severe septal deviation to the right with the nose going to the left.    The mouth has a large tongue.  Oropharynx I can see using 2 tongue blades,    there is a little bit of erythema, but no obstruction, no posterior pharyngeal edema and is quite dry.  NECK:  Soft and supple anteriorly.  There is no lymphadenopathy or tenderness   anteriorly.  When I palpate the sternocleidomastoids, they are fine, but when I   palpate the epaxial muscles on the back, he cried on pain and jumped.    The labs were reviewed.  He showed mildly elevated white count of 14.  His   strep was negative.  I also reviewed the CT which there was concern on the CT   of an early retropharyngeal abscess.   I do not see any evidence of forming abscess and minimal   ascending edema.  The airway is widely patent.    IMPRESSION:  Neck pain.  This does look like it is most likely   musculoskeletal, especially given the tenderness on the back of the neck.  He   does have a mild white count.  It would not be inappropriate to give him   steroids and  some Unasyn.  In addition, it does seem like he would benefit   from muscle relaxers.  This was discussed with the admitting resident.  Please   call me if there are any questions or concerns.       ____________________________________     MD CLAUDIA Mendosa / MORIS    DD:  01/07/2019 19:41:11  DT:  01/07/2019 20:50:09    D#:  [id number]  Job#:  [id number]

## 2019-01-08 NOTE — PROGRESS NOTES
CROSS COVERAGE    Spoke with ENT; reviewed imaging. Recommended antibiotics and few doses of steroids. Consider flexeril or Robaxin for neck pain. Ok to transition to oral antibiotics. Cleared for d/c

## 2019-01-08 NOTE — ED NOTES
Pt resting in bed, No needs at this time, VSS  No distress noted.   Awaiting [facility] to call back for plan of care update after Dr Figueroa consult

## 2019-01-08 NOTE — H&P
"[facility] INTERNAL MEDICINE SENIOR ADMIT NOTE:    Patient ID:   Name:             Jaquan Fernandez   YOB: 1970  Age:                 48 y.o.  male   MRN:               [id number]                                                          Chief Complaint:       Dysphagia / Odynophagia     History of Present Illness:    Mr. Fernandez is a 48 y.o. male with a PMHx of Gout (not on medication) came into ED with the chief complaints of Dysphagia / Odynophagia. Pt started to have pain at the back of his neck 4 days, which is getting worse up to a point he could not eat related to pain. Pt went to urgent care yesterday. CT neck was done today & found to have developing retropharyngeal abscess.   Pt's mother had a flu around [facility] & he also had a cold. Denied fever, chills, chest pain, diarrhea, constipation, headache, confusion, dizziness, weakness/numbness in upper & lower extremities.       ROS: Positive for Dysphagia / Odynophagia  No cervical lymphadenopathy. Mallampati 4.   No stridor.   Pain at that back pf neck. Pain with neck movements.   Apart from the above symptoms, pt has no other significant ROS.     LABS: Leucocytosis of 14.2  IMAGING: CT neck was done today & found to have developing retropharyngeal abscess.       Active Ambulatory Problems     Diagnosis Date Noted   • No Active Ambulatory Problems     Resolved Ambulatory Problems     Diagnosis Date Noted   • No Resolved Ambulatory Problems     No Additional Past Medical History       PHYSICAL EXAM  Vitals:   Weight/BMI: Body mass index is 26.98 kg/m².  Blood pressure 136/89, pulse 70, temperature 36.4 °C (97.6 °F), temperature source Temporal, resp. rate 16, height 1.88 m (6' 2\"), weight 95.3 kg (210 lb 1.6 oz), SpO2 100 %.  Vitals:    01/07/19 1541 01/07/19 1549 01/07/19 1600 01/07/19 1630   BP:       Pulse: 69 (!) 54 79 70   Resp:       Temp:       TempSrc:       SpO2: 93% 100% 100% 100%   Weight:       Height:         Oxygen Therapy:  Pulse " Oximetry: 100 %, O2 Delivery: None (Room Air)     GENERAL: Not in acute distress.   HEENT: PERRLA, NCAT. No cervical lymphadenopathy. Mallampati 4.   No stridor. Pain at that back pf neck with/without palpation. Pain with neck movements.   CVS: S1 S2 , No murmur   Abdomen: ND NT, BS +ve   Neuro: AAO x 3 , Motor 5/5 , sensation & CN 2-12 grossly intact.     ASSESSMENT:  #Developing retropharyngeal abscess  #Leucocytosis   PLAN:  - Admit to Sx Floor   - ENT consulted stat   - IVF  - IV steroid   - Pain control   - NPO till ENT evaluation   - Rapid Flu test & rapid strep test ordered   - Blood Cx  - Unasyn & Vancomycin started  - Px is guarded. Low threshold for intubation if respiratory status deteriorates.      Please refer to 's H&P for complete admission details.

## 2019-01-08 NOTE — CARE PLAN
Problem: Safety  Goal: Will remain free from falls    Intervention: Implement fall precautions  Call light w/in reach. Instructed pt to call for assistance before getting OOB- pt verbalized understanding.        Problem: Pain Management  Goal: Pain level will decrease to patient's comfort goal    Intervention: Follow pain managment plan developed in collaboration with patient and Interdisciplinary Team  Tylenol and flexeril given with +results. Educated pt on importance of pain control- pt verbalized understanding.

## 2019-01-08 NOTE — PROGRESS NOTES
Patient resting in room. Family member was at bedside on arrival to unit. Family member no longer present.

## 2019-01-08 NOTE — PROGRESS NOTES
Pt aaox4. STEPHEN 5/5. Up w/ SBA, steady gait. Pt c/o neck pain, controlled with tylenol and flexeril. +BS, pt tolerating full liquid diet. Voiding w/o difficulty. Reviewed poc with pt-verbalized understanding. Call light in reach.

## 2019-01-08 NOTE — H&P
"      Internal Medicine Admitting History and Physical    Note Author: Tuan Taylor M.D.       Name Jaquan Fernandez     1970   Age/Sex 48 y.o. male   MRN [id number]   Code Status Full Code     After 5PM or if no immediate response to page, please call for cross-coverage  Attending/Team: /Terry See Patient List for primary contact information  Call [phone] to page    1st Call - Day Intern (R1):   Dr. Taylor 2nd Call - Day Sr. Resident (R2/R3):   Dr. Flor       Chief Complaint:   Neck pain with difficulty swallowing    HPI:  Pt is a 47 YO male came in for worsening neck pain that started on 1/3/2018. Pt subsequently went to Urgent Care on 2018 where CT neck was ordered. Pt was called by MD to go to ED for concerning Retropharyngeal abscess extending from C2 to the upper thoracic spine, \"most concerning for inflammatory process and developing abscess.\"    Pt reports pain radiating from neck to base of skull \"worse pain of my life\". Pain worsened by movement. He has to find the \"right spot\" to stay still for pain relief. He was given muscle relaxer with naproxen by urgent care MD with some relief. Pt denies uncontrollable pain at this time.     Pt had tonsillectomy as a child and visits the dentist twice/year for teeth cleaning. He denies any problems in the oral cavity. He does report mother has been sick with influenza B over nikol[place]. He also reports dry cough without fever, chills, weight changes    ENT was consulted. Spoke with Dr. Figueroa. She will evaluate the pt in ED. Please DO NOT move the pt from ED until evaluated by ARUN Figueroa. Thank you. ED charge nurse and pt's nurse aware.     Review of Systems   Constitutional: Negative for chills and fever.   HENT: Negative for ear discharge, ear pain, hearing loss, sore throat and tinnitus.         Odynophagia     Eyes: Negative for blurred vision and double vision.   Respiratory: Negative for cough and hemoptysis.    Cardiovascular: " Negative for chest pain and palpitations.   Gastrointestinal: Negative for heartburn and nausea.   Genitourinary: Negative for dysuria and urgency.   Musculoskeletal: Positive for neck pain. Negative for myalgias.        Stiff neck  Somewhat Swollen neck as per wife at bedside   Neurological: Negative for dizziness and headaches.   Psychiatric/Behavioral: Negative for depression and suicidal ideas.             Past Medical History (Chronic medical problem, known complications and current treatment)    None    Past Surgical History:  History reviewed. No pertinent surgical history.    Current Outpatient Medications:  Home Medications     Reviewed by Lilli Yang (Pharmacy Tech) on 01/07/19 at 1530  Med List Status: Complete   Medication Last Dose Status   Acetaminophen (TYLENOL PO) 1/6/2019 Active   cyclobenzaprine (FLEXERIL) 10 MG Tab 1/7/2019 Active   ibuprofen (MOTRIN) 200 MG Tab 1/6/2019 Active   naproxen (NAPROSYN) 500 MG Tab 1/7/2019 Active   Naproxen Sodium (ALEVE) 220 MG Cap 1/6/2019 Active                Medication Allergy/Sensitivities:  No Known Allergies      Family History (mandatory)   History reviewed. No pertinent family history.   Pt was adopted. No family history known    Social History (mandatory)   Social History     Social History   • Marital status:      Spouse name: N/A   • Number of children: N/A   • Years of education: N/A     Occupational History   • Not on file.     Social History Main Topics   • Smoking status: Never Smoker   • Smokeless tobacco: Never Used   • Alcohol use Yes   • Drug use: No   • Sexual activity: Yes     Partners: Female     Birth control/ protection: Condom     Other Topics Concern   • Not on file     Social History Narrative   • No narrative on file     Living situation: With Wife  PCP : Marshall Jimenez M.D.    Physical Exam     Vitals:    01/07/19 1541 01/07/19 1549 01/07/19 1600 01/07/19 1630   BP:       Pulse: 69 (!) 54 79 70   Resp:      "  Temp:       TempSrc:       SpO2: 93% 100% 100% 100%   Weight:       Height:         Body mass index is 26.98 kg/m².  /89   Pulse 70   Temp 36.4 °C (97.6 °F) (Temporal)   Resp 16   Ht 1.88 m (6' 2\")   Wt 95.3 kg (210 lb 1.6 oz)   SpO2 100%   BMI 26.98 kg/m²   O2 therapy: Pulse Oximetry: 100 %, O2 Delivery: None (Room Air)    Physical Exam   Constitutional: He is oriented to person, place, and time and well-developed, well-nourished, and in no distress.   Pleasant, healthy appearing male     HENT:   Head: Normocephalic.   Mouth/Throat: Oropharynx is clear and moist. No oropharyngeal exudate.   Mallampati 4   Darkened cavities in posterior teeth   Eyes: Pupils are equal, round, and reactive to light. EOM are normal. Right eye exhibits no discharge.   Neck: No thyromegaly present.   Somewhat enlarged neck  Slow movement of head/neck due to pain   Cardiovascular: Normal rate, regular rhythm and intact distal pulses.  Exam reveals no friction rub.    No murmur heard.  Pulmonary/Chest: Effort normal and breath sounds normal. No respiratory distress.   Abdominal: Soft. Bowel sounds are normal. He exhibits no distension. There is no tenderness.   Musculoskeletal: Normal range of motion. He exhibits no edema or deformity.   Neurological: He is alert and oriented to person, place, and time. He has normal reflexes.   Skin: Skin is warm and dry. He is not diaphoretic. No erythema.   Psychiatric: Mood, memory, affect and judgment normal.         Data Review       Old Records Request:   Deferred  Current Records review/summary: Completed    Lab Data Review:  Recent Results (from the past 24 hour(s))   CBC WITH DIFFERENTIAL    Collection Time: 01/07/19  3:07 PM   Result Value Ref Range    WBC 14.2 (H) 4.8 - 10.8 K/uL    RBC 4.76 4.70 - 6.10 M/uL    Hemoglobin 14.2 14.0 - 18.0 g/dL    Hematocrit 42.7 42.0 - 52.0 %    MCV 89.7 81.4 - 97.8 fL    MCH 29.8 27.0 - 33.0 pg    MCHC 33.3 (L) 33.7 - 35.3 g/dL    RDW 42.6 35.9 " - 50.0 fL    Platelet Count 338 164 - 446 K/uL    MPV 9.6 9.0 - 12.9 fL    Neutrophils-Polys 71.90 44.00 - 72.00 %    Lymphocytes 19.30 (L) 22.00 - 41.00 %    Monocytes 7.10 0.00 - 13.40 %    Eosinophils 0.80 0.00 - 6.90 %    Basophils 0.60 0.00 - 1.80 %    Immature Granulocytes 0.30 0.00 - 0.90 %    Nucleated RBC 0.00 /100 WBC    Neutrophils (Absolute) 10.21 (H) 1.82 - 7.42 K/uL    Lymphs (Absolute) 2.73 1.00 - 4.80 K/uL    Monos (Absolute) 1.00 (H) 0.00 - 0.85 K/uL    Eos (Absolute) 0.12 0.00 - 0.51 K/uL    Baso (Absolute) 0.08 0.00 - 0.12 K/uL    Immature Granulocytes (abs) 0.04 0.00 - 0.11 K/uL    NRBC (Absolute) 0.00 K/uL   COMP METABOLIC PANEL    Collection Time: 01/07/19  3:07 PM   Result Value Ref Range    Sodium 140 135 - 145 mmol/L    Potassium 4.2 3.6 - 5.5 mmol/L    Chloride 103 96 - 112 mmol/L    Co2 27 20 - 33 mmol/L    Anion Gap 10.0 0.0 - 11.9    Glucose 78 65 - 99 mg/dL    Bun 13 8 - 22 mg/dL    Creatinine 0.79 0.50 - 1.40 mg/dL    Calcium 9.7 8.5 - 10.5 mg/dL    AST(SGOT) 11 (L) 12 - 45 U/L    ALT(SGPT) 14 2 - 50 U/L    Alkaline Phosphatase 73 30 - 99 U/L    Total Bilirubin 0.4 0.1 - 1.5 mg/dL    Albumin 4.2 3.2 - 4.9 g/dL    Total Protein 7.6 6.0 - 8.2 g/dL    Globulin 3.4 1.9 - 3.5 g/dL    A-G Ratio 1.2 g/dL   PROTHROMBIN TIME    Collection Time: 01/07/19  3:07 PM   Result Value Ref Range    PT 13.6 12.0 - 14.6 sec    INR 1.02 0.87 - 1.13   APTT    Collection Time: 01/07/19  3:07 PM   Result Value Ref Range    APTT 29.1 24.7 - 36.0 sec   ESTIMATED GFR    Collection Time: 01/07/19  3:07 PM   Result Value Ref Range    GFR If African American >60 >60 mL/min/1.73 m 2    GFR If Non African American >60 >60 mL/min/1.73 m 2   RAPID STREP,CULT IF INDICATED    Collection Time: 01/07/19  4:22 PM   Result Value Ref Range    Significant Indicator NEG     Source THRT     Site THROAT     Rapid Strep Screen       Negative for Group A streptococcus.  A negative result may be obtained if the specimen  "is  inadequate or antigen concentration is below the  sensitivity of the test. This negative test will be followed  up with a culture as requested.         Imaging/Procedures Review:    Independant Imaging Review: Completed  CT-SOFT TISSUE NECK WITH   Final Result      Diffuse edema within the retropharyngeal space extending from C2 to the upper thoracic spine, most concerning for inflammatory process and developing abscess.      These findings were discussed with BLANK SANON on 1/7/2019 3:49 PM.            EKG:   EKG Independant Review: Completed  QTc:, HR: , Normal Sinus Rhythm, no ST/T changes   Records reviewed and summarized in current documentation :  Yes  [facility] teaching service handout given to patient:  No         Assessment/Plan     * Neck pain   Assessment & Plan    Pt is a 47 YO male presented with worsening neck pain with odynophagia(solids and liquids)   -Pt was sent by Urgent Care MD due to abnormal findings on CT  -CT scan showed possible Retropharyngeal abscess extending from C2 to the upper thoracic spine, \"most concerning for inflammatory process and developing abscess.\"  -WBC 14  -Pt denies SOB, fever, chills, trouble breathing  -Cause of abscess is unclear. Possible dental source. Pt reports getting his teeth cleaned twice/year with good oral care. I was able to appreciate darkened teeth in the posterior. Cavities?  -Denies any oral pain    Plan  STAT ENT consult  Unasyn/vanco  IV steroids  Morphine  Pending influenza and strep  Fluids  Low threshold for ICU for possible airway compromise        Odynophagia   Assessment & Plan    Pt is a 47 YO male presented with worsening neck pain with odynophagia(solids and liquids)   -Pt was sent by Urgent Care MD due to abnormal findings on CT  -CT scan showed possible Retropharyngeal abscess extending from C2 to the upper thoracic spine, \"most concerning for inflammatory process and developing abscess.\"  -WBC 14  -Pt denies SOB, fever, chills, trouble " breathing  -Cause of abscess is unclear. Possible dental source. Pt reports getting his teeth cleaned twice/year with good oral care. I was able to appreciate darkened teeth in the posterior. Cavities?  -Denies any oral pain      Plan  STAT ENT consult  Unasyn/vanco  IV steroids  Morphine  Pending influenza and strep  Fluids  Low threshold for ICU for possible airway compromise            Anticipated Hospital stay:  >2 midnights        Quality Measures  Quality-Core Measures  PCP: Marshall Jimenez M.D.

## 2019-01-08 NOTE — PROGRESS NOTES
"       Internal Medicine Interval Note  Note Author: Tuan Taylor M.D.     Name Jaquan Fernandez     1970   Age/Sex 48 y.o. male   MRN [id number]   Code Status Full Code     After 5PM or if no immediate response to page, please call for cross-coverage  Attending/Team: /Terry See Patient List for primary contact information  Call [phone] to page    1st Call - Day Intern (R1):    2nd Call - Day Sr. Resident (R2/R3):            Reason for interval visit  (Principal Problem)   Pt is a 49 YO male came in for worsening neck pain that started on 1/3/2018. Pt subsequently went to Urgent Care on 2018 where CT neck was ordered. Pt was called by MD to go to ED for concerning Retropharyngeal abscess extending from C2 to the upper thoracic spine, \"most concerning for inflammatory process and developing abscess.\"    Interval Problem Daily Status Update  (24 hours, problem oriented, brief subjective history, new lab/imaging data pertinent to that problem)     ENT was consulted: Unlikely to be Retropharyngeal abscess. Likely to be \"musculoskeletal given the tenderness on the back of the neck.\"  -Tapering down Decadron  -TID Flexeril  -Advancing diet as tolerated  -Likely discharge tomorrow  -Discontinued Vanco and morphine      ROS  Constitutional: Negative for chills and fever.   HENT: Negative for ear discharge, ear pain, hearing loss, sore throat and tinnitus.         Odynophagia     Eyes: Negative for blurred vision and double vision.   Respiratory: Negative for cough and hemoptysis.    Cardiovascular: Negative for chest pain and palpitations.   Gastrointestinal: Negative for heartburn and nausea.   Genitourinary: Negative for dysuria and urgency.   Musculoskeletal: Positive for neck pain. Negative for myalgias.        Stiff neck/pain. Worsened by movement(Improving)  Neurological: Negative for dizziness and headaches.   Psychiatric/Behavioral: Negative for depression and suicidal " ideas.     Disposition/Barriers to discharge:   Neck pain    Consultants/Specialty  ENT    PCP: Marshall Jimenez M.D.      Quality Measures  Quality-Core Measures   Reviewed items::  Radiology images reviewed, Medications reviewed, Labs reviewed and EKG reviewed  Holder catheter::  No Holder  DVT prophylaxis - mechanical:  SCDs          Physical Exam       Vitals:    01/08/19 0008 01/08/19 0400 01/08/19 0438 01/08/19 0751   BP: 124/71 122/70  122/71   Pulse: 72 74 73 60   Resp: 20 20  16   Temp: 37.1 °C (98.7 °F) 36.6 °C (97.8 °F)  36.1 °C (97 °F)   TempSrc:    Temporal   SpO2: 95% 95% 93% 96%   Weight:       Height:         Body mass index is 26.98 kg/m². Weight: 95.3 kg (210 lb 1.6 oz)  Oxygen Therapy:  Pulse Oximetry: 96 %, O2 (LPM): 1.5, FiO2%: 21 %, O2 Delivery: Nasal Cannula    Physical Exam  Constitutional: He is oriented to person, place, and time and well-developed, well-nourished, and in no distress.   Pleasant, healthy appearing male     HENT:   Head: Normocephalic.   Mouth/Throat: Oropharynx is clear and moist. No oropharyngeal exudate.   Mallampati 4   Darkened cavities in posterior teeth   Eyes: Pupils are equal, round, and reactive to light. EOM are normal. Right eye exhibits no discharge.   Neck: No thyromegaly present.   Somewhat enlarged neck  Slow movement of head/neck due to pain   Cardiovascular: Normal rate, regular rhythm and intact distal pulses.  Exam reveals no friction rub.    No murmur heard.  Pulmonary/Chest: Effort normal and breath sounds normal. No respiratory distress.   Abdominal: Soft. Bowel sounds are normal. He exhibits no distension. There is no tenderness.   Musculoskeletal: Normal range of motion. He exhibits no edema or deformity.   Neurological: He is alert and oriented to person, place, and time. He has normal reflexes.   Skin: Skin is warm and dry. He is not diaphoretic. No erythema.   Psychiatric: Mood, memory, affect and judgment normal.         Assessment/Plan     *  "Neck pain   Assessment & Plan    Pt is a 47 YO male presented with worsening neck pain with odynophagia(solids and liquids)   -Pt was sent by Urgent Care MD due to abnormal findings on CT  -CT scan showed possible Retropharyngeal abscess extending from C2 to the upper thoracic spine, \"most concerning for inflammatory process and developing abscess.\"  -WBC 14  -Pt denies SOB, fever, chills, trouble breathing  -Cause of abscess is unclear. Possible dental source. Pt reports getting his teeth cleaned twice/year with good oral care. I was able to appreciate darkened teeth in the posterior. Cavities?  -Denies any oral pain    Plan  ENT was consulted: Unlikely to be Retropharyngeal abscess. Likely to be \"musculoskeletal given the tenderness on the back of the neck.\"  -Tapering down Decadron  -TID Flexeril  -Advancing diet as tolerated  -Likely discharge tomorrow  -Continue Unasyn  -Discontinued Vanco         Odynophagia   Assessment & Plan    Pt is a 47 YO male presented with worsening neck pain with odynophagia(solids and liquids)   -Pt was sent by Urgent Care MD due to abnormal findings on CT  -CT scan showed possible Retropharyngeal abscess extending from C2 to the upper thoracic spine, \"most concerning for inflammatory process and developing abscess.\"  -WBC 14  -Pt denies SOB, fever, chills, trouble breathing  -Cause of abscess is unclear. Possible dental source. Pt reports getting his teeth cleaned twice/year with good oral care. I was able to appreciate darkened teeth in the posterior. Cavities?  -Denies any oral pain      Plan  ENT was consulted: Unlikely to be Retropharyngeal abscess. Likely to be \"musculoskeletal given the tenderness on the back of the neck.\"  -Tapering down Decadron  -TID Flexeril  -Advancing diet as tolerated  -Likely discharge tomorrow  -Continue Unasyn  -Discontinued Vanco            "

## 2019-01-09 ENCOUNTER — PATIENT OUTREACH (OUTPATIENT)
Dept: HEALTH INFORMATION MANAGEMENT | Facility: OTHER | Age: 49
End: 2019-01-09

## 2019-01-09 VITALS
OXYGEN SATURATION: 97 % | SYSTOLIC BLOOD PRESSURE: 135 MMHG | DIASTOLIC BLOOD PRESSURE: 78 MMHG | TEMPERATURE: 97.8 F | RESPIRATION RATE: 18 BRPM | HEIGHT: 74 IN | BODY MASS INDEX: 26.96 KG/M2 | HEART RATE: 71 BPM | WEIGHT: 210.1 LBS

## 2019-01-09 PROBLEM — R13.10 ODYNOPHAGIA: Status: RESOLVED | Noted: 2019-01-07 | Resolved: 2019-01-09

## 2019-01-09 LAB
S PYO SPEC QL CULT: NORMAL
SIGNIFICANT IND 70042: NORMAL
SITE SITE: NORMAL
SOURCE SOURCE: NORMAL

## 2019-01-09 PROCEDURE — 700111 HCHG RX REV CODE 636 W/ 250 OVERRIDE (IP): Performed by: STUDENT IN AN ORGANIZED HEALTH CARE EDUCATION/TRAINING PROGRAM

## 2019-01-09 PROCEDURE — A9270 NON-COVERED ITEM OR SERVICE: HCPCS | Performed by: STUDENT IN AN ORGANIZED HEALTH CARE EDUCATION/TRAINING PROGRAM

## 2019-01-09 PROCEDURE — 700105 HCHG RX REV CODE 258: Performed by: STUDENT IN AN ORGANIZED HEALTH CARE EDUCATION/TRAINING PROGRAM

## 2019-01-09 PROCEDURE — 700102 HCHG RX REV CODE 250 W/ 637 OVERRIDE(OP): Performed by: STUDENT IN AN ORGANIZED HEALTH CARE EDUCATION/TRAINING PROGRAM

## 2019-01-09 PROCEDURE — 99239 HOSP IP/OBS DSCHRG MGMT >30: CPT | Mod: GC | Performed by: INTERNAL MEDICINE

## 2019-01-09 RX ORDER — AMOXICILLIN AND CLAVULANATE POTASSIUM 875; 125 MG/1; MG/1
1 TABLET, FILM COATED ORAL 2 TIMES DAILY
Qty: 14 TAB | Refills: 0 | Status: SHIPPED | OUTPATIENT
Start: 2019-01-09 | End: 2019-01-16

## 2019-01-09 RX ORDER — DEXAMETHASONE 4 MG/1
4 TABLET ORAL 2 TIMES DAILY
Qty: 5 TAB | Refills: 0 | Status: SHIPPED | OUTPATIENT
Start: 2019-01-09 | End: 2019-12-24

## 2019-01-09 RX ADMIN — DEXAMETHASONE SODIUM PHOSPHATE 4 MG: 4 INJECTION, SOLUTION INTRA-ARTICULAR; INTRALESIONAL; INTRAMUSCULAR; INTRAVENOUS; SOFT TISSUE at 04:06

## 2019-01-09 RX ADMIN — STANDARDIZED SENNA CONCENTRATE AND DOCUSATE SODIUM 2 TABLET: 8.6; 5 TABLET, FILM COATED ORAL at 04:05

## 2019-01-09 RX ADMIN — SODIUM CHLORIDE: 9 INJECTION, SOLUTION INTRAVENOUS at 07:34

## 2019-01-09 RX ADMIN — CYCLOBENZAPRINE 10 MG: 10 TABLET, FILM COATED ORAL at 11:06

## 2019-01-09 RX ADMIN — SODIUM CHLORIDE 3 G: 900 INJECTION INTRAVENOUS at 09:46

## 2019-01-09 RX ADMIN — CYCLOBENZAPRINE 10 MG: 10 TABLET, FILM COATED ORAL at 04:05

## 2019-01-09 RX ADMIN — SODIUM CHLORIDE 3 G: 900 INJECTION INTRAVENOUS at 03:34

## 2019-01-09 ASSESSMENT — LIFESTYLE VARIABLES
HAVE YOU EVER FELT YOU SHOULD CUT DOWN ON YOUR DRINKING: YES
CONSUMPTION TOTAL: POSITIVE
EVER HAD A DRINK FIRST THING IN THE MORNING TO STEADY YOUR NERVES TO GET RID OF A HANGOVER: NO
TOTAL SCORE: 1
TOTAL SCORE: 1
EVER FELT BAD OR GUILTY ABOUT YOUR DRINKING: NO
ALCOHOL_USE: YES
HOW MANY TIMES IN THE PAST YEAR HAVE YOU HAD 5 OR MORE DRINKS IN A DAY: 10
ON A TYPICAL DAY WHEN YOU DRINK ALCOHOL HOW MANY DRINKS DO YOU HAVE: 3
AVERAGE NUMBER OF DAYS PER WEEK YOU HAVE A DRINK CONTAINING ALCOHOL: 2
TOTAL SCORE: 1
HAVE PEOPLE ANNOYED YOU BY CRITICIZING YOUR DRINKING: NO

## 2019-01-09 ASSESSMENT — COGNITIVE AND FUNCTIONAL STATUS - GENERAL
TURNING FROM BACK TO SIDE WHILE IN FLAT BAD: A LITTLE
MOVING TO AND FROM BED TO CHAIR: A LITTLE
SUGGESTED CMS G CODE MODIFIER MOBILITY: CJ
SUGGESTED CMS G CODE MODIFIER DAILY ACTIVITY: CJ
DAILY ACTIVITIY SCORE: 22
MOBILITY SCORE: 22
DRESSING REGULAR LOWER BODY CLOTHING: A LITTLE
HELP NEEDED FOR BATHING: A LITTLE

## 2019-01-09 ASSESSMENT — PAIN SCALES - GENERAL
PAINLEVEL_OUTOF10: 2
PAINLEVEL_OUTOF10: 0
PAINLEVEL_OUTOF10: 0

## 2019-01-09 ASSESSMENT — PATIENT HEALTH QUESTIONNAIRE - PHQ9
1. LITTLE INTEREST OR PLEASURE IN DOING THINGS: NOT AT ALL
SUM OF ALL RESPONSES TO PHQ9 QUESTIONS 1 AND 2: 0
1. LITTLE INTEREST OR PLEASURE IN DOING THINGS: NOT AT ALL
2. FEELING DOWN, DEPRESSED, IRRITABLE, OR HOPELESS: NOT AT ALL
2. FEELING DOWN, DEPRESSED, IRRITABLE, OR HOPELESS: NOT AT ALL
SUM OF ALL RESPONSES TO PHQ9 QUESTIONS 1 AND 2: 0

## 2019-01-09 NOTE — PROGRESS NOTES
1108 New order received & acknowledged from dr Quentin Flor for the patient to be discharged home.

## 2019-01-09 NOTE — CARE PLAN
Problem: Safety  Goal: Will remain free from injury  Outcome: PROGRESSING AS EXPECTED  Sfaety precaution in effect. Call light within reach. Reminded patient to call for assist. Hourly rounds in effect. Verbalized understanding.    Problem: Infection  Goal: Will remain free from infection  Outcome: PROGRESSING AS EXPECTED  Implement Standard precaution. Hand washing every encounter & before & after patient care. Verbalized understanding.    Problem: Knowledge Deficit  Goal: Knowledge of disease process/condition, treatment plan, diagnostic tests, and medications will improve  Outcome: PROGRESSING AS EXPECTED  Discussed Plan of care. Questions answered. Verbalized understanding.

## 2019-01-09 NOTE — PROGRESS NOTES
1322  Discharge instructions given to the patient & spouse. Verbalized understanding. Patient was discharged with e prescriptions - Decadron & Augmentin. Offered w/c,  patient declined. Patient has a steady gait. Discharged accompanied by spouse via Private car.

## 2019-01-09 NOTE — PROGRESS NOTES
0710 Patient's sitting up in bed. Bedside report received from NOC RN (Ghanshyam) at the beginning of the shift. No distress noted.    0727 Patient's sitting up in bed. IVF patent & infusing. No c/o's pain or discomfort at this time. Fall Protocol in effect. Call light within reach. Reminded patient to call for assist. Assessment completed. No distress noted. Plan of care reviewed with the patient. verbalized understanding.    7427 Patient's in bed. Spouse visiting. IV ABX patent & infusing. No distress noted.

## 2019-01-09 NOTE — DISCHARGE SUMMARY
Internal Medicine Discharge Summary  Note Author: Quentin Flor M.D.       Name Jaquan Fernandez     1970   Age/Sex 48 y.o. male   MRN [id number]         Admit Date:  2019       Discharge Date:   18    Service:   [facility] Internal Medicine Green Team  Attending Physician(s):        Senior Resident(s):   Dr Flor  Sundeep Resident(s):   Dr Taylor   PCP: Marshall Jimenez M.D.      Primary Diagnosis:   Developing retropharyngeal abscess     Secondary Diagnoses:                Principal Problem:    Neck pain POA: Improving   Resolved Problems:    Odynophagia POA: Improving       Hospital Summary (Brief Narrative):        A 48 y.o. male with a PMHx of Gout (not on medication) came into ED with the chief complaints of Dysphagia / Odynophagia. Pt started to have pain at the back of his neck 4 days, which is getting worse up to a point he could not eat related to pain. Pt went to urgent care yesterday. CT neck was done on  & found to have developing retropharyngeal abscess. Pt's mother had a flu around [facility] & he also had a cold. Denied fever, chills, chest pain, diarrhea, constipation, headache, confusion, dizziness, weakness/numbness in upper & lower extremities.   At ED, labs showed  Leucocytosis of 14.2, Flu -ve & rapid strept test -ve.   CT neck  found to have developing retropharyngeal abscess. Pt was started on IV Decadron & IV Unasyn & vanco. ENT consulted stat. Per ENT recommendation, no Surgical drainage needed, continue IV Unasyn & D/C Vanco. Flexeril TID to control neck pain which seems to be working very well & IV Decadron was tapering down. Diet was advanced as tolerated & pt is eating regular solid food. Pt's neck pain is 1-2/10 this AM & ROM is better.   Pt was discharged home with 7 days of oral Augmentin, Steroid tapered dose x 4 days & Flexeril ( Pt already has prescription for Flexeril from Urgent Care). Advised to follow up with PCP 1-2 weeks after discharge.  "    Patient /Hospital Summary (Details -- Problem Oriented) :          * Neck pain   Assessment & Plan    Pt is a 47 YO male presented with worsening neck pain with odynophagia(solids and liquids)   -Pt was sent by Urgent Care MD due to abnormal findings on CT  -CT scan showed possible Retropharyngeal abscess extending from C2 to the upper thoracic spine, \"most concerning for inflammatory process and developing abscess.\"  -WBC 14  -Pt denies SOB, fever, chills, trouble breathing  -Cause of abscess is unclear. Possible dental source. Pt reports getting his teeth cleaned twice/year with good oral care. I was able to appreciate darkened teeth in the posterior. Cavities?  -Denies any oral pain    Plan:  ENT was consulted: Unlikely to be Retropharyngeal abscess. Likely to be \"musculoskeletal given the tenderness on the back of the neck.\"  - s/p IV Unasyn & IV Decadron tapered   -Tolerating solid diet   -Pt was discharged home with 7 days of oral Augmentin, Steroid tapered dose x 4 days & Flexeril ( Pt already has prescription for Flexeril from Urgent Care). Advised to follow up with PCP 1-2 weeks after discharge.            Odynophagia-resolved as of 1/9/2019   Assessment & Plan    Pt is a 47 YO male presented with worsening neck pain with odynophagia(solids and liquids)   -Pt was sent by Urgent Care MD due to abnormal findings on CT  -CT scan showed possible Retropharyngeal abscess extending from C2 to the upper thoracic spine, \"most concerning for inflammatory process and developing abscess.\"  -WBC 14  -Pt denies SOB, fever, chills, trouble breathing  -Cause of abscess is unclear. Possible dental source. Pt reports getting his teeth cleaned twice/year with good oral care. I was able to appreciate darkened teeth in the posterior. Cavities?  -Denies any oral pain  Plan:  ENT was consulted: Unlikely to be Retropharyngeal abscess. Likely to be \"musculoskeletal given the tenderness on the back of the neck.\"  - s/p IV Unasyn " & IV Decadron tapered   - neck pain 1-2 this AM, ROM better   -Pt was discharged home with 7 days of oral Augmentin, Steroid tapered dose x 4 days & Flexeril ( Pt already has prescription for Flexeril from Urgent Care). Advised to follow up with PCP 1-2 weeks after discharge.              Consultants:     ENT: Kerri Figueroa M.D.    Procedures:        None     Imaging/ Testing:      CT neck was done on 1/07 & found to have developing retropharyngeal abscess.     Discharge Medications:         Medication Reconciliation: Completed       Medication List      START taking these medications      Instructions   amoxicillin-clavulanate 875-125 MG Tabs  Commonly known as:  AUGMENTIN   Take 1 Tab by mouth 2 times a day for 7 days.  Dose:  1 Tab     dexamethasone 4 MG Tabs  Commonly known as:  DECADRON   Doctor's comments:  Dexamethasone 4 mg twice daily for 1 day followed by Dexamethasone 4 mg once daily for 3 days.  Take 1 Tab by mouth 2 times a day.  Dose:  4 mg        CONTINUE taking these medications      Instructions   cyclobenzaprine 10 MG Tabs  Commonly known as:  FLEXERIL   Take 1 Tab by mouth 3 times a day as needed for Muscle Spasms. Sedation side effects discussed. No Driving or alcohol with medication given.  Dose:  10 mg     ibuprofen 200 MG Tabs  Commonly known as:  MOTRIN   Take 400 mg by mouth 1 time daily as needed.  Dose:  400 mg        STOP taking these medications    ALEVE 220 MG Caps  Generic drug:  Naproxen Sodium     naproxen 500 MG Tabs  Commonly known as:  NAPROSYN        ASK your doctor about these medications      Instructions   TYLENOL PO   Take 2 Tabs by mouth 1 time daily as needed.  Dose:  2 Tab            Can use .DISCHARGEMEDSLIST if going to another facility         Disposition:   Home    Diet: Healthy    Activity:   As Tolerated     Instructions:      Follow up with PCP 1-2 wks after Discharge.   The patient was instructed to return to the ER in the event of worsening symptoms. I have  counseled the patient on the importance of compliance and the patient has agreed to proceed with all medical recommendations and follow up plan indicated above.   The patient understands that all medications come with benefits and risks. Risks may include permanent injury or death and these risks can be minimized with close reassessment and monitoring.        Primary Care Provider:    Marshall Jimenez M.D.  Discharge summary faxed to primary care provider:  Completed  Copy of discharge summary given to the patient: Completed      Follow up appointment details :      Follow up with PCP 1-2 weeks after Discharge.     Pending Studies:        None    Time spent on discharge day patient visit, preparing discharge paperwork and arranging for patient follow up.    Summary of follow up issues:   Follow up with PCP 1-2 weeks after Discharge.     Discharge Time (Minutes) :    60 mins  Hospital Course Type:  Inpatient Stay >2 midnights      Condition on Discharge    ______________________________________________________________________    Interval history/exam for day of discharge:     - No events overnight. No complaints on AM round.   - Neck pain 1-2/10, ROM better    - Pt was discharged home with 7 days of oral Augmentin, Steroid tapered dose x 4 days & Flexeril ( Pt already has prescription for Flexeril from Urgent Care). Advised to follow up with PCP 1-2 weeks after discharge.     ROS  Constitutional: Negative for chills and fever.   HENT: Negative for ear discharge, ear pain, hearing loss, sore throat and tinnitus.    Eyes: Negative for blurred vision and double vision.   Respiratory: Negative for cough and hemoptysis.    Cardiovascular: Negative for chest pain and palpitations.   Gastrointestinal: Negative for heartburn and nausea.   Genitourinary: Negative for dysuria and urgency.   Musculoskeletal: Positive for neck pain. Negative for myalgias.   Neurological: Negative for dizziness and headaches.    Psychiatric/Behavioral: Negative for depression and suicidal ideas.     Physical Exam  Constitutional: He is oriented to person, place, and time and well-developed, well-nourished, and in no distress.   Pleasant, healthy appearing male  HENT:   Head: Normocephalic.   Mouth/Throat: Oropharynx is clear and moist. No oropharyngeal exudate.   Mallampati 4   Darkened cavities in posterior teeth   Eyes: Pupils are equal, round, and reactive to light. EOM are normal. Right eye exhibits no discharge.   Neck: No thyromegaly present.   Somewhat enlarged neck  Slow movement of head/neck due to pain , ROM better this AM   Cardiovascular: Normal rate, regular rhythm and intact distal pulses.  Exam reveals no friction rub.    No murmur heard.  Pulmonary/Chest: Effort normal and breath sounds normal. No respiratory distress.   Abdominal: Soft. Bowel sounds are normal. He exhibits no distension. There is no tenderness.   Musculoskeletal: Normal range of motion. He exhibits no edema or deformity.   Neurological: He is alert and oriented to person, place, and time. He has normal reflexes.   Skin: Skin is warm and dry. He is not diaphoretic. No erythema.   Psychiatric: Mood, memory, affect and judgment normal.     Most Recent Labs:    Lab Results   Component Value Date/Time    WBC 14.4 (H) 01/08/2019 02:27 AM    RBC 4.46 (L) 01/08/2019 02:27 AM    HEMOGLOBIN 13.3 (L) 01/08/2019 02:27 AM    HEMATOCRIT 40.5 (L) 01/08/2019 02:27 AM    MCV 90.8 01/08/2019 02:27 AM    MCH 29.8 01/08/2019 02:27 AM    MCHC 32.8 (L) 01/08/2019 02:27 AM    MPV 10.2 01/08/2019 02:27 AM    NEUTSPOLYS 88.70 (H) 01/08/2019 02:27 AM    LYMPHOCYTES 8.40 (L) 01/08/2019 02:27 AM    MONOCYTES 2.20 01/08/2019 02:27 AM    EOSINOPHILS 0.10 01/08/2019 02:27 AM    BASOPHILS 0.20 01/08/2019 02:27 AM      Lab Results   Component Value Date/Time    SODIUM 140 01/08/2019 02:27 AM    POTASSIUM 4.2 01/08/2019 02:27 AM    CHLORIDE 106 01/08/2019 02:27 AM    CO2 23 01/08/2019  02:27 AM    GLUCOSE 103 (H) 01/08/2019 02:27 AM    BUN 13 01/08/2019 02:27 AM    CREATININE 0.69 01/08/2019 02:27 AM      Lab Results   Component Value Date/Time    ALTSGPT 9 01/08/2019 02:27 AM    ASTSGOT 10 (L) 01/08/2019 02:27 AM    ALKPHOSPHAT 73 01/08/2019 02:27 AM    TBILIRUBIN 0.5 01/08/2019 02:27 AM    ALBUMIN 4.0 01/08/2019 02:27 AM    GLOBULIN 2.9 01/08/2019 02:27 AM    INR 1.02 01/07/2019 03:07 PM     Lab Results   Component Value Date/Time    PROTHROMBTM 13.6 01/07/2019 03:07 PM    INR 1.02 01/07/2019 03:07 PM

## 2019-01-09 NOTE — DISCHARGE PLANNING
Anticipated Discharge Disposition:   home    Action:   Message sent to the [facility] MD about dc concerns and plans.    Barriers to Discharge:   Medical clearance    Plan:   Follow up with [facility] MD.   RN kindly notify CM if there are any discharge concerns.     Addendum:  RN stated that pt has been discharged without any needs pr concerns.

## 2019-01-09 NOTE — NON-PROVIDER
Internal Medicine Medical Student Note  Note Author: Salima Azevedo, Student    Name Jaquan Fernandez     1970   Age/Sex 48 y.o. male   MRN [id number]   Code Status Full code      Reason for interval visit  (Principal Problem)   Neck pain    Interval Problem Daily Status Update  (problem status, last 24 hours, new history, new data )   No acute events overnight. This morning Mr. Fernandez is in 1/10 pain, which has been controlled well and attributes this likely to either the flexeril or steroids. His diet has progressed to full, having a normal dinner last night, and he is tolerating this well.     Physical Exam  Gen: Well nourished male laying comfortably in bed   Head: Normocephalic, atraumatic   Neck: No LAD, diffuse tenderness along posterior aspect of neck  Card: RRR, no murmurs appreciated  Pulm: Normal respiratory effort, clear bilaterally  GI: Normoactive BS, NT, ND       Vitals:    19 0109 19 0327 19 0409 19 0445   BP:    128/93   Pulse: (!) 53 72 (!) 55 (!) 59   Resp:    17   Temp:    36.1 °C (97 °F)   TempSrc:    Temporal   SpO2: 93% 94% 96% 94%   Weight:       Height:         Body mass index is 26.98 kg/m².    Oxygen Therapy:  Pulse Oximetry: 94 %, O2 (LPM): 0, O2 Delivery: None (Room Air)    Physical Exam    Assessment/Plan   Neck pain: Retropharyngeal infection versus musculoskeletal. Given leukocytosis (14.2), recent sick contact, CT findings of diffuse retropharyngeal edema and potential complications of this infection (acute necrotizing mediastinitis), suspicion for retropharyngeal infection remains high. ENT believes this to be of musculoskeletal origin due to character of tenderness.   -Diet has progress to full and pt is tolerating this well, plan for d/c on PO meds  -Last dose of unasyn today (today is day 3)  -D/c on 875mg Augmentin BID for 7d (total of 10d AB course)  -D/c on tapered dose of dexamethasone (BID for 1day, then daily for 3days)  -Pt has flexeril at  home from last urgent care visit, will cont to take 10mg TID for 1d then PRN     Salima Azevedo, MS3

## 2019-01-09 NOTE — DISCHARGE INSTRUCTIONS
.Amoxicillin; Clavulanic Acid tablets  What is this medicine?  AMOXICILLIN; CLAVULANIC ACID (a mox i NAT in; BRIE brandin bharat ic AS id) is a penicillin antibiotic. It is used to treat certain kinds of bacterial infections. It will not work for colds, flu, or other viral infections.  This medicine may be used for other purposes; ask your health care provider or pharmacist if you have questions.  COMMON BRAND NAME(S): Augmentin  What should I tell my health care provider before I take this medicine?  They need to know if you have any of these conditions:  -bowel disease, like colitis  -kidney disease  -liver disease  -mononucleosis  -an unusual or allergic reaction to amoxicillin, penicillin, cephalosporin, other antibiotics, clavulanic acid, other medicines, foods, dyes, or preservatives  -pregnant or trying to get pregnant  -breast-feeding  How should I use this medicine?  Take this medicine by mouth with a full glass of water. Follow the directions on the prescription label. Take at the start of a meal. Do not crush or chew. If the tablet has a score line, you may cut it in half at the score line for easier swallowing. Take your medicine at regular intervals. Do not take your medicine more often than directed. Take all of your medicine as directed even if you think you are better. Do not skip doses or stop your medicine early.  Talk to your pediatrician regarding the use of this medicine in children. Special care may be needed.  Overdosage: If you think you have taken too much of this medicine contact a poison control center or emergency room at once.  NOTE: This medicine is only for you. Do not share this medicine with others.  What if I miss a dose?  If you miss a dose, take it as soon as you can. If it is almost time for your next dose, take only that dose. Do not take double or extra doses.  What may interact with this medicine?  -allopurinol  -anticoagulants  -birth control  pills  -methotrexate  -probenecid  This list may not describe all possible interactions. Give your health care provider a list of all the medicines, herbs, non-prescription drugs, or dietary supplements you use. Also tell them if you smoke, drink alcohol, or use illegal drugs. Some items may interact with your medicine.  What should I watch for while using this medicine?  Tell your doctor or health care professional if your symptoms do not improve.  Do not treat diarrhea with over the counter products. Contact your doctor if you have diarrhea that lasts more than 2 days or if it is severe and watery.  If you have diabetes, you may get a false-positive result for sugar in your urine. Check with your doctor or health care professional.  Birth control pills may not work properly while you are taking this medicine. Talk to your doctor about using an extra method of birth control.  What side effects may I notice from receiving this medicine?  Side effects that you should report to your doctor or health care professional as soon as possible:  -allergic reactions like skin rash, itching or hives, swelling of the face, lips, or tongue  -breathing problems  -dark urine  -fever or chills, sore throat  -redness, blistering, peeling or loosening of the skin, including inside the mouth  -seizures  -trouble passing urine or change in the amount of urine  -unusual bleeding, bruising  -unusually weak or tired  -white patches or sores in the mouth or throat  Side effects that usually do not require medical attention (report to your doctor or health care professional if they continue or are bothersome):  -diarrhea  -dizziness  -headache  -nausea, vomiting  -stomach upset  -vaginal or anal irritation  This list may not describe all possible side effects. Call your doctor for medical advice about side effects. You may report side effects to FDA at 0-669-FDA-5657.  Where should I keep my medicine?  Keep out of the reach of  children.  Store at room temperature below 25 degrees C (77 degrees F). Keep container tightly closed. Throw away any unused medicine after the expiration date.  NOTE: This sheet is a summary. It may not cover all possible information. If you have questions about this medicine, talk to your doctor, pharmacist, or health care provider.  © 2018 [employer] (2009-03-12 12:04:30)      Discharge Instructions    Discharged to home by car with relative. Discharged via wheelchair, hospital escort: Yes.  Special equipment needed: Not Applicable    Be sure to schedule a follow-up appointment with your primary care doctor or any specialists as instructed.     Discharge Plan:   Influenza Vaccine Indication: Indicated: 9 to 64 years of age, Patient Refuses    I understand that a diet low in cholesterol, fat, and sodium is recommended for good health. Unless I have been given specific instructions below for another diet, I accept this instruction as my diet prescription.   Other diet: Regular as tolerated    Special Instructions: None    · Is patient discharged on Warfarin / Coumadin?   No     Depression / Suicide Risk    As you are discharged from this [facility] facility, it is important to learn how to keep safe from harming yourself.    Recognize the warning signs:  · Abrupt changes in personality, positive or negative- including increase in energy   · Giving away possessions  · Change in eating patterns- significant weight changes-  positive or negative  · Change in sleeping patterns- unable to sleep or sleeping all the time   · Unwillingness or inability to communicate  · Depression  · Unusual sadness, discouragement and loneliness  · Talk of wanting to die  · Neglect of personal appearance   · Rebelliousness- reckless behavior  · Withdrawal from people/activities they love  · Confusion- inability to concentrate     If you or a loved one observes any of these behaviors or has concerns about self-harm, here's  what you can do:  · Talk about it- your feelings and reasons for harming yourself  · Remove any means that you might use to hurt yourself (examples: pills, rope, extension cords, firearm)  · Get professional help from the community (Mental Health, Substance Abuse, psychological counseling)  · Do not be alone:Call your Safe Contact- someone whom you trust who will be there for you.  · Call your local CRISIS HOTLINE 562-4252 or 703-158-3383  · Call your local Children's Mobile Crisis Response Team [place] (665) 574-0762 or wwwLanzaloya.com  · Call the toll free National Suicide Prevention Hotlines   · National Suicide Prevention Lifeline 224-838-HCYY (7902)  · National Hope Line Network 800-SUICIDE (840-0504)    Dexamethasone tablets  What is this medicine?  DEXAMETHASONE (dex a METH a sone) is a corticosteroid. It is commonly used to treat inflammation of the skin, joints, lungs, and other organs. Common conditions treated include asthma, allergies, and arthritis. It is also used for other conditions, such as blood disorders and diseases of the adrenal glands.  This medicine may be used for other purposes; ask your health care provider or pharmacist if you have questions.  COMMON BRAND NAME(S): CUSHINGS SYNDROME DIAGNOSTIC, Decadron, DexPak Jr TaperPak, DexPak TaperPak, Zema-Bogdan, ZonaCort 11 Day, ZonaCort 7 Day  What should I tell my health care provider before I take this medicine?  They need to know if you have any of these conditions:  -Cushing's syndrome  -diabetes  -glaucoma  -heart problems or disease  -high blood pressure  -infection like herpes, measles, tuberculosis, or chickenpox  -kidney disease  -liver disease  -mental problems  -myasthenia gravis  -osteoporosis  -previous heart attack  -seizures  -stomach, ulcer or intestine disease including colitis and diverticulitis  -thyroid problem  -an unusual or allergic reaction to dexamethasone, corticosteroids, other medicines, lactose, foods, dyes,  or preservatives  -pregnant or trying to get pregnant  -breast-feeding  How should I use this medicine?  Take this medicine by mouth with a drink of water. Follow the directions on the prescription label. Take it with food or milk to avoid stomach upset. If you are taking this medicine once a day, take it in the morning. Do not take more medicine than you are told to take. Do not suddenly stop taking your medicine because you may develop a severe reaction. Your doctor will tell you how much medicine to take. If your doctor wants you to stop the medicine, the dose may be slowly lowered over time to avoid any side effects.  Talk to your pediatrician regarding the use of this medicine in children. Special care may be needed.  Patients over 65 years old may have a stronger reaction and need a smaller dose.  Overdosage: If you think you have taken too much of this medicine contact a poison control center or emergency room at once.  NOTE: This medicine is only for you. Do not share this medicine with others.  What if I miss a dose?  If you miss a dose, take it as soon as you can. If it is almost time for your next dose, talk to your doctor or health care professional. You may need to miss a dose or take an extra dose. Do not take double or extra doses without advice.  What may interact with this medicine?  Do not take this medicine with any of the following medications:  -mifepristone, RU-486  -vaccines  This medicine may also interact with the following medications:  -amphotericin B  -antibiotics like clarithromycin, erythromycin, and troleandomycin  -aspirin and aspirin-like drugs  -barbiturates like phenobarbital  -carbamazepine  -cholestyramine  -cholinesterase inhibitors like donepezil, galantamine, rivastigmine, and tacrine  -cyclosporine  -digoxin  -diuretics  -ephedrine  -female hormones, like estrogens or progestins and birth control pills  -indinavir  -isoniazid  -ketoconazole  -medicines for  diabetes  -medicines that improve muscle tone or strength for conditions like myasthenia gravis  -NSAIDs, medicines for pain and inflammation, like ibuprofen or naproxen  -phenytoin  -rifampin  -thalidomide  -warfarin  This list may not describe all possible interactions. Give your health care provider a list of all the medicines, herbs, non-prescription drugs, or dietary supplements you use. Also tell them if you smoke, drink alcohol, or use illegal drugs. Some items may interact with your medicine.  What should I watch for while using this medicine?  Visit your doctor or health care professional for regular checks on your progress. If you are taking this medicine over a prolonged period, carry an identification card with your name and address, the type and dose of your medicine, and your doctor's name and address.  This medicine may increase your risk of getting an infection. Stay away from people who are sick. Tell your doctor or health care professional if you are around anyone with measles or chickenpox.  If you are going to have surgery, tell your doctor or health care professional that you have taken this medicine within the last twelve months.  Ask your doctor or health care professional about your diet. You may need to lower the amount of salt you eat.  The medicine can increase your blood sugar. If you are a diabetic check with your doctor if you need help adjusting the dose of your diabetic medicine.  What side effects may I notice from receiving this medicine?  Side effects that you should report to your doctor or health care professional as soon as possible:  -allergic reactions like skin rash, itching or hives, swelling of the face, lips, or tongue  -changes in vision  -fever, sore throat, sneezing, cough, or other signs of infection, wounds that will not heal  -increased thirst  -mental depression, mood swings, mistaken feelings of self importance or of being mistreated  -pain in hips, back, ribs,  arms, shoulders, or legs  -redness, blistering, peeling or loosening of the skin, including inside the mouth  -trouble passing urine or change in the amount of urine  -swelling of feet or lower legs  -unusual bleeding or bruising  Side effects that usually do not require medical attention (report to your doctor or health care professional if they continue or are bothersome):  -headache  -nausea, vomiting  -skin problems, acne, thin and shiny skin  -weight gain  This list may not describe all possible side effects. Call your doctor for medical advice about side effects. You may report side effects to FDA at 2-782-FDA-9902.  Where should I keep my medicine?  Keep out of the reach of children.  Store at room temperature between 20 and 25 degrees C (68 and 77 degrees F). Protect from light. Throw away any unused medicine after the expiration date.  NOTE: This sheet is a summary. It may not cover all possible information. If you have questions about this medicine, talk to your doctor, pharmacist, or health care provider.  © 2018 [employer]/Gold Standard (2009-04-09 14:02:13)

## 2019-01-12 LAB
BACTERIA BLD CULT: NORMAL
BACTERIA BLD CULT: NORMAL
SIGNIFICANT IND 70042: NORMAL
SIGNIFICANT IND 70042: NORMAL
SITE SITE: NORMAL
SITE SITE: NORMAL
SOURCE SOURCE: NORMAL
SOURCE SOURCE: NORMAL

## 2019-10-17 ENCOUNTER — HOSPITAL ENCOUNTER (OUTPATIENT)
Dept: RADIOLOGY | Facility: MEDICAL CENTER | Age: 49
End: 2019-10-17
Attending: PHYSICIAN ASSISTANT
Payer: COMMERCIAL

## 2019-10-17 ENCOUNTER — OFFICE VISIT (OUTPATIENT)
Dept: URGENT CARE | Facility: CLINIC | Age: 49
End: 2019-10-17
Payer: COMMERCIAL

## 2019-10-17 VITALS
HEIGHT: 74 IN | SYSTOLIC BLOOD PRESSURE: 142 MMHG | BODY MASS INDEX: 26.95 KG/M2 | WEIGHT: 210 LBS | TEMPERATURE: 97.6 F | HEART RATE: 62 BPM | OXYGEN SATURATION: 100 % | RESPIRATION RATE: 16 BRPM | DIASTOLIC BLOOD PRESSURE: 98 MMHG

## 2019-10-17 DIAGNOSIS — R20.0 NUMBNESS OF ANTERIOR THIGH: ICD-10-CM

## 2019-10-17 DIAGNOSIS — R42 DIZZINESS: ICD-10-CM

## 2019-10-17 PROCEDURE — 99214 OFFICE O/P EST MOD 30 MIN: CPT | Performed by: PHYSICIAN ASSISTANT

## 2019-10-17 PROCEDURE — 700117 HCHG RX CONTRAST REV CODE 255: Performed by: PHYSICIAN ASSISTANT

## 2019-10-17 PROCEDURE — 70470 CT HEAD/BRAIN W/O & W/DYE: CPT

## 2019-10-17 RX ADMIN — IOHEXOL 80 ML: 350 INJECTION, SOLUTION INTRAVENOUS at 16:50

## 2019-10-17 ASSESSMENT — ENCOUNTER SYMPTOMS
VOMITING: 0
BACK PAIN: 0
SORE THROAT: 0
BLURRED VISION: 0
DIZZINESS: 1
SINUS PAIN: 0
SPEECH CHANGE: 0
PALPITATIONS: 0
FALLS: 0
DIARRHEA: 0
COUGH: 0
FEVER: 0
TINGLING: 1
SPUTUM PRODUCTION: 0
NAUSEA: 0
BRUISES/BLEEDS EASILY: 0
SENSORY CHANGE: 1
DOUBLE VISION: 0
CONSTIPATION: 0
ABDOMINAL PAIN: 0
PHOTOPHOBIA: 0
WEAKNESS: 1
HEADACHES: 0
NECK PAIN: 0
MYALGIAS: 0
CHILLS: 0

## 2019-10-17 NOTE — PROGRESS NOTES
Subjective:      Jaquan Fernandez is a 49 y.o. male who presents with Dizziness (x1 month) and Numbness (x1 month, Right leg above knee)            HPI  49-year-old male presents urgent care with new problem of dizziness x1 month.  States dizziness has been constant and intermittently worse.  He denies lightheadedness with standing.  He states dizziness is not positional.  Denies visual acuity changes, headaches, unilateral weakness, facial droop, or difficultly with speech.   Patient states he checked his BP last and this morning, max reading was 160/100.  He denies history of hypertension, he states he has not been to primary care provider in a few years.  He does not regularly take his blood pressure, however decided to check it last night. Denies chest pain or SOB.     He also reports numbness over anterior aspect of right thigh x 2 months. Patient reports he was moving rocks in his backyard at onset of symptoms.  Patient states numbness has been constant since. Nothing makes the numbness better or worse.   Denies difficultly with ambulation or weakness. Denies low back pain.    Hx of retropharyngeal abscess, patient was admitted through emergency department and treated with IV antibiotics and steroids.  Patient does not know family history, states he was adopted.     Review of Systems   Constitutional: Negative for chills, fever and malaise/fatigue.   HENT: Negative for congestion, ear pain, hearing loss, sinus pain, sore throat and tinnitus.    Eyes: Negative for blurred vision, double vision and photophobia.   Respiratory: Negative for cough and sputum production.    Cardiovascular: Negative for chest pain, palpitations and leg swelling.   Gastrointestinal: Negative for abdominal pain, constipation, diarrhea, nausea and vomiting.   Musculoskeletal: Negative for back pain, falls, myalgias and neck pain.   Skin: Negative for rash.   Neurological: Positive for dizziness, tingling, sensory change and weakness.  "Negative for speech change and headaches.   Endo/Heme/Allergies: Does not bruise/bleed easily.       History reviewed. No pertinent past medical history.  Current Outpatient Medications on File Prior to Visit   Medication Sig Dispense Refill   • ibuprofen (MOTRIN) 200 MG Tab Take 400 mg by mouth 1 time daily as needed.     • cyclobenzaprine (FLEXERIL) 10 MG Tab Take 1 Tab by mouth 3 times a day as needed for Muscle Spasms. Sedation side effects discussed. No Driving or alcohol with medication given. 30 Tab 0   • dexamethasone (DECADRON) 4 MG Tab Take 1 Tab by mouth 2 times a day. 5 Tab 0     No current facility-administered medications on file prior to visit.      No Known Allergies  Social History     Tobacco Use   • Smoking status: Never Smoker   • Smokeless tobacco: Never Used   Substance Use Topics   • Alcohol use: Yes      Objective:     /98 (BP Location: Right arm, Patient Position: Sitting, BP Cuff Size: Adult long)   Pulse 62   Temp 36.4 °C (97.6 °F) (Temporal)   Resp 16   Ht 1.88 m (6' 2\")   Wt 95.3 kg (210 lb)   SpO2 100%   BMI 26.96 kg/m²      Physical Exam   Constitutional: He is oriented to person, place, and time. He appears well-developed and well-nourished. No distress.   HENT:   Head: Normocephalic and atraumatic.   Nose: Nose normal.   Mouth/Throat: Oropharynx is clear and moist.   Eyes: Pupils are equal, round, and reactive to light. Conjunctivae and EOM are normal. Right eye exhibits normal extraocular motion. Left eye exhibits normal extraocular motion.   Mild lateral nystagmus with gauze to the left   Neck: Normal range of motion. Neck supple.   Cardiovascular: Normal rate, regular rhythm, normal heart sounds and intact distal pulses.   No murmur heard.  Pulmonary/Chest: Effort normal and breath sounds normal. No respiratory distress.   Abdominal: Soft. There is no tenderness.   Lymphadenopathy:     He has no cervical adenopathy.   Neurological: He is alert and oriented to person, " place, and time. Coordination normal.    5 out of 5 left upper extremity and 4 out of 5 right upper extremity.  Positive mild decreased motor strength right lower extremity.  Cranial nerves II through XII grossly intact.  Positive decreased sensation to touch over right anterior thigh.   Skin: Skin is warm and dry. Capillary refill takes less than 2 seconds. No pallor.   Psychiatric: He has a normal mood and affect. His behavior is normal. Judgment and thought content normal.   Vitals reviewed.              Assessment/Plan:     1. Dizziness  REFERRAL TO NEUROLOGY    CT-HEAD WITH & W/O   2. Numbness of anterior thigh  REFERRAL TO NEUROLOGY    CT-HEAD WITH & W/O     CT head:   Impression       No acute intracranial abnormality is identified.     Discussed negative results of head CT with patient by phone.  Recommend patient follow-up with neurology, urgent referral sent.  Patient told to return for reevaluation or proceed to emergency department for any worsening of symptoms or development of worsening dizziness, difficulty with ambulation/speech, unilateral weakness or visual acuity changes.  Recommend patient follow-up with PCP for evaluation  increased blood pressure.  Blood pressure was 142/98 in clinic today.  Patient advised to keep a blood pressure log.  Recommend patient take his blood pressure at the same time twice a day for the next 2 weeks or so.  Bring this log to primary care in order to be evaluated for hypertension.  Patient verbalized understanding of treatment plan and has no further questions regarding care.

## 2019-11-04 ENCOUNTER — TELEPHONE (OUTPATIENT)
Dept: SCHEDULING | Facility: IMAGING CENTER | Age: 49
End: 2019-11-04

## 2019-12-24 ENCOUNTER — OFFICE VISIT (OUTPATIENT)
Dept: MEDICAL GROUP | Facility: LAB | Age: 49
End: 2019-12-24
Payer: COMMERCIAL

## 2019-12-24 VITALS
RESPIRATION RATE: 16 BRPM | DIASTOLIC BLOOD PRESSURE: 80 MMHG | BODY MASS INDEX: 28.89 KG/M2 | HEART RATE: 66 BPM | HEIGHT: 73 IN | TEMPERATURE: 97.3 F | SYSTOLIC BLOOD PRESSURE: 138 MMHG | WEIGHT: 218 LBS | OXYGEN SATURATION: 96 %

## 2019-12-24 DIAGNOSIS — Z13.6 SCREENING FOR CARDIOVASCULAR CONDITION: ICD-10-CM

## 2019-12-24 DIAGNOSIS — Z13.1 SCREENING FOR DIABETES MELLITUS: ICD-10-CM

## 2019-12-24 DIAGNOSIS — Z12.11 SCREEN FOR COLON CANCER: ICD-10-CM

## 2019-12-24 DIAGNOSIS — R42 DIZZINESS: ICD-10-CM

## 2019-12-24 DIAGNOSIS — Z23 NEED FOR VACCINATION: ICD-10-CM

## 2019-12-24 DIAGNOSIS — R03.0 ELEVATED BLOOD PRESSURE READING: ICD-10-CM

## 2019-12-24 PROCEDURE — 90472 IMMUNIZATION ADMIN EACH ADD: CPT | Performed by: FAMILY MEDICINE

## 2019-12-24 PROCEDURE — 90471 IMMUNIZATION ADMIN: CPT | Performed by: FAMILY MEDICINE

## 2019-12-24 PROCEDURE — 90715 TDAP VACCINE 7 YRS/> IM: CPT | Performed by: FAMILY MEDICINE

## 2019-12-24 PROCEDURE — 99203 OFFICE O/P NEW LOW 30 MIN: CPT | Mod: 25 | Performed by: FAMILY MEDICINE

## 2019-12-24 PROCEDURE — 90686 IIV4 VACC NO PRSV 0.5 ML IM: CPT | Performed by: FAMILY MEDICINE

## 2019-12-24 ASSESSMENT — ENCOUNTER SYMPTOMS
BLURRED VISION: 0
FEVER: 0
MYALGIAS: 0
CONSTIPATION: 0
COUGH: 0
DIZZINESS: 1
SHORTNESS OF BREATH: 0
WHEEZING: 0
HEADACHES: 0
FOCAL WEAKNESS: 0
PALPITATIONS: 0
CHILLS: 0
SORE THROAT: 0
ABDOMINAL PAIN: 0
DIARRHEA: 0

## 2019-12-24 ASSESSMENT — PATIENT HEALTH QUESTIONNAIRE - PHQ9: CLINICAL INTERPRETATION OF PHQ2 SCORE: 0

## 2019-12-27 ENCOUNTER — APPOINTMENT (OUTPATIENT)
Dept: RADIOLOGY | Facility: MEDICAL CENTER | Age: 49
End: 2019-12-27
Attending: FAMILY MEDICINE
Payer: COMMERCIAL

## 2019-12-27 DIAGNOSIS — R42 DIZZINESS: ICD-10-CM

## 2019-12-27 PROCEDURE — A9576 INJ PROHANCE MULTIPACK: HCPCS | Performed by: FAMILY MEDICINE

## 2019-12-27 PROCEDURE — 700117 HCHG RX CONTRAST REV CODE 255: Performed by: FAMILY MEDICINE

## 2019-12-27 PROCEDURE — 70553 MRI BRAIN STEM W/O & W/DYE: CPT

## 2019-12-27 RX ADMIN — GADOTERIDOL 20 ML: 279.3 INJECTION, SOLUTION INTRAVENOUS at 09:32

## 2020-01-17 ENCOUNTER — PHYSICAL THERAPY (OUTPATIENT)
Dept: PHYSICAL THERAPY | Facility: REHABILITATION | Age: 50
End: 2020-01-17
Attending: FAMILY MEDICINE
Payer: COMMERCIAL

## 2020-01-17 DIAGNOSIS — R42 DIZZINESS: ICD-10-CM

## 2020-01-17 PROCEDURE — 97535 SELF CARE MNGMENT TRAINING: CPT

## 2020-01-17 PROCEDURE — 97162 PT EVAL MOD COMPLEX 30 MIN: CPT

## 2020-01-17 ASSESSMENT — ENCOUNTER SYMPTOMS
PAIN SCALE AT LOWEST: 0
PAIN SCALE AT HIGHEST: 6
PAIN SCALE: 0

## 2020-01-17 NOTE — OP THERAPY EVALUATION
Outpatient Physical Therapy  VESTIBULAR EVALUATION    [facility] Physical Therapy [facility]  [address]  [address]  [place] [address]  Phone:  [phone]  Fax:  [phone]    Date of Evaluation: 2020    Patient: Jaquan Fernandez  YOB: 1970  MRN: [id number]     Referring Provider: Satya Hernandez M.D.  [address]  [place] [address]   Referring Diagnosis: Dizziness [R42]     Time Calculation  Start time: 803  Stop time: 854 Time Calculation (min): 51 minutes       Physical Therapy Occurrence Codes    Date of onset of impairment:  10/17/19   Date physical therapy care plan established or reviewed:  20   Date physical therapy treatment started:  20          Chief Complaint: Vertigo    Visit Diagnoses     ICD-10-CM   1. Dizziness R42         History of Present Illness:     Mechanism of injury:  Pt presents to PT with complaint of dizziness.  Onset In Oct 2019. Seen in urgent care 10/17/2019. He did have a normal CT head at that time. Had referral sent to neurology (not completed yet). Has continued to follow up with his PCP.  Primarily described as an intermittent sensations of unsteadiness, typically triggered by motion, lasting less than 1 min. Had one longer lasting episode (2019), which caused 24 hs of dizziness and resulted in vomiting.  This is worse in afternoons. Denies being ill at the time of onset. Admits to light allergies in the fall, but no need for medication.  Recent MRI is clear.  Has not had sx in the last 2-3 weeks.      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  Hobbies: fly fishing, skiing.     Headaches: no headaches      Sleep disturbance:  Not disrupted  Symptoms:     Current symptom ratin    At best symptom ratin    At worst symptom ratin (avg day when active still)    Progression:  Improving  Social Support:     Lives in:  Multiple-level home    Lives with:  Young children and  spouse  Diagnostic Tests:     MRI studies: normal    Treatments:     No prior treatments received    Patient goals:     Other patient goals:  Ensure no future episodes.       History reviewed. No pertinent past medical history.  Past Surgical History:   Procedure Laterality Date   • ACL RECONSTRUCTION       Social History     Tobacco Use   • Smoking status: Never Smoker   • Smokeless tobacco: Never Used   Substance Use Topics   • Alcohol use: Yes     Family and Occupational History     Patient does not qualify to have social determinant information on file (likely too young).   Socioeconomic History   • Marital status:      Spouse name: Not on file   • Number of children: Not on file   • Years of education: Not on file   • Highest education level: Not on file   Occupational History   • Not on file       Objective:    Gait:     Comments: Unremarkable  Vestibulospinal Exam:     MCTSIB:         Firm, eyes open: within normal limits        Firm, eyes closed: within normal limits        Foam, eyes open: within normal limits        Foam, eyes closed: 96% below normal        Composite Score: 41% below normal      Oculomotor Exam:         Details: No spontaneous nystagmus central gaze          Details: No spontaneous nystagmus eccentric gaze    No saccadic eye movements    Smooth pursuit present    Convergence:        Normal convergence    No skew  Active Range of Motion:     Active range of motion comments: C/S AROM is WNL.  Feels a slight delay/reset with return from rotation or flexion  Limb Ataxia Exam:     Finger-to-Nose:         Intention tremor: none    Dysdiadochokinesia: none.  Strength Exam:     Upper extremities within functional limits    Lower extremities within functional limits  Reflex Exam:       Deep Tendon Reflexes:         Left L4 (Patellar): normal (2+)        Left S1 (Achilles): normal (2+)        Right L4 (Patellar): normal (2+)        Right S1 (Achilles): normal (2+)  Sensation Tests:     Left  Light Touch Sensation:         All left lumbar dermatomes intact      Right Light Touch Sensation:         All right lumbar dermatomes intact      Vestibulo-Ocular Exam:   Normal head thrust test    Comments: VORx1 is asx  BPPV Exam:     Normal [place]-Hallpike    Normal straight head-hanging test    Normal roll test    Comments: First R hallpike showed a very mild R upward torsional nystagmus, which did not fatigue with prolonged hold.  Resolved completely on 2nd attempt with frenzel lenses  Breathing-Related Tests:     Normal hyperventilation test    Normal Valsalva test        Therapeutic Treatments and Modalities:     1. Functional Training, Self Care (CPT 62645), Education: BPPV vs hypofunction in presentation and tx.  Testing results, balance systems, VRT HEP with HO provided: head circles, VOR with balance challenge, foam EC with sways, gait with HTs.  Discussed goals and just right challenge.     Time-based treatments/modalities:  Functional training, self care minutes (CPT 55954): 20 minutes         Assessment:     Functional impairments:  Decreased utilization of VIS/vest/somato and decreased postural stability    Assessment details:  Mr. Fernandez is a 49 y.o male who presents to PT with compliant of intermittent dizziness beginning in Oct 2019 and just improving drastically approx 3 weeks ago.  PT evaluation is most consistent with vestibular hypofunction, possibly an episode of vestibular neuritis/labrynthitis when more acute.  Pt is demonstrating good compensation in all areas, except notable instability in condition 4 (vestibular dominant) balance.  He does show good learning effect through the testing and was able to complete a full 10 sec trial on the final attempt.  This indicates he is a good candidate for skilled PT services to assist in fully recovering balance and reducing risk of falls in the future.  Tx will be guided mainly through HEP progressions as sx are stable and pt has high out of pocket cost  for PT.     Prognosis: good    Goals:     Short term goals:  - Indep with VRT HEP guided by packet    Short term goal time span:  1-2 weeks    Long term goals:  - Compliant with completing 10 min VRT per day  - DHI less than 2%  - MCTSIB WNL across all measures.     Long term goal time span:  6-8 weeks  Plan:     Therapy options:  Physical therapy treatment to continue    Planned therapy interventions:  Functional Training, Self Care (CPT 20041), Neuromuscular Re-education (CPT 07387), Therapeutic Exercise (CPT 83730) and Therapeutic Activities (CPT 64904)    Frequency:  1x week    Duration in weeks:  8    Discussed with:  Patient      Functional Assessment Used    DHI= 28%      Referring provider co-signature:  I have reviewed this plan of care and my co-signature certifies the need for services.  Certification Dates:   From 01/17/20     To 03/13/20    Physician Signature: ________________________________ Date: ______________

## 2020-01-21 ENCOUNTER — APPOINTMENT (OUTPATIENT)
Dept: PHYSICAL THERAPY | Facility: REHABILITATION | Age: 50
End: 2020-01-21
Attending: FAMILY MEDICINE
Payer: COMMERCIAL

## 2020-01-23 ENCOUNTER — APPOINTMENT (OUTPATIENT)
Dept: PHYSICAL THERAPY | Facility: REHABILITATION | Age: 50
End: 2020-01-23
Attending: FAMILY MEDICINE
Payer: COMMERCIAL

## 2020-01-28 ENCOUNTER — APPOINTMENT (OUTPATIENT)
Dept: PHYSICAL THERAPY | Facility: REHABILITATION | Age: 50
End: 2020-01-28
Attending: FAMILY MEDICINE
Payer: COMMERCIAL

## 2020-01-30 ENCOUNTER — APPOINTMENT (OUTPATIENT)
Dept: PHYSICAL THERAPY | Facility: REHABILITATION | Age: 50
End: 2020-01-30
Attending: FAMILY MEDICINE
Payer: COMMERCIAL

## 2020-02-04 ENCOUNTER — APPOINTMENT (OUTPATIENT)
Dept: PHYSICAL THERAPY | Facility: REHABILITATION | Age: 50
End: 2020-02-04
Attending: FAMILY MEDICINE
Payer: COMMERCIAL

## 2020-02-06 ENCOUNTER — APPOINTMENT (OUTPATIENT)
Dept: PHYSICAL THERAPY | Facility: REHABILITATION | Age: 50
End: 2020-02-06
Attending: FAMILY MEDICINE
Payer: COMMERCIAL

## 2020-02-10 NOTE — OP THERAPY DAILY TREATMENT
Outpatient Physical Therapy  DAILY TREATMENT     [facility] Physical [address].  [address]  [place] [address]  Phone:  [phone]  Fax:  [phone]    Date: 02/11/2020    Patient: Jaquan Fernandez  YOB: 1970  MRN: [id number]     Time Calculation  Start time: 0759  Stop time: 0815 Time Calculation (min): 16 minutes       Chief Complaint: Vertigo    Visit #: 2    SUBJECTIVE:  HEP has gone very well.  No complaints.     OBJECTIVE:  Current objective measures:   MCTSIB: WNL across all conditions  Dizziness Handicap Inventory - Physical Items Score: 0  Dizziness Handicap Inventory - Emotional Items Score: 0  Dizziness Handicap Inventory - Functional Items Score: 0  Dizziness Handicap Inventory - Total Score: 0       Therapeutic Treatments and Modalities:     1. Neuromuscular Re-education (CPT 35064)    Therapeutic Treatment and Modalities Summary:   - Reassessment of obj measures as above  - No questions regarding HEP  - Reviewed what to do if vertigo relapses.  (gain new referral to return for assessment).  Check in with HEP at least once a week to be sure he is maintaining the skills.     Time-based treatments/modalities:  Neuromusc re-ed, balance, coor, post minutes (CPT 49376): 16 minutes       ASSESSMENT:   Response to treatment: D/C to HEP    PLAN/RECOMMENDATIONS:   Plan for treatment: d/c.

## 2020-02-11 ENCOUNTER — PHYSICAL THERAPY (OUTPATIENT)
Dept: PHYSICAL THERAPY | Facility: REHABILITATION | Age: 50
End: 2020-02-11
Attending: FAMILY MEDICINE
Payer: COMMERCIAL

## 2020-02-11 DIAGNOSIS — R42 DIZZINESS: ICD-10-CM

## 2020-02-11 PROCEDURE — 97112 NEUROMUSCULAR REEDUCATION: CPT

## 2020-02-11 NOTE — OP THERAPY DISCHARGE SUMMARY
Outpatient Physical Therapy  DISCHARGE SUMMARY NOTE      [facility] Physical Therapy [facility]  [address]  [address]  [place] [address]  Phone:  [phone]  Fax:  [phone]    Date of Visit: 02/11/2020    Patient: Jaquan Fernandez  YOB: 1970  MRN: [id number]     Referring Provider: Satya Hernandez M.D.  [address]  [place] [address]   Referring Diagnosis Dizziness [R42]     Physical Therapy Occurrence Codes    Date of onset of impairment:  10/17/19   Date physical therapy care plan established or reviewed:  1/17/20   Date physical therapy treatment started:  1/17/20          Functional Assessment Used  Dizziness Handicap Inventory - Physical Items Score: 0  Dizziness Handicap Inventory - Emotional Items Score: 0  Dizziness Handicap Inventory - Functional Items Score: 0  Dizziness Handicap Inventory - Total Score: 0     Your patient is being discharged from Physical Therapy with the following comments:   · Goals met    Comments:  Mr. Fernandez was seen for 2 PT sessions treating his dizziness, most consistent with vestibular hypofunction at [facility].  HEP established and follow up today reveals full resolution of dizziness and balance back to WNL.  Pt demonstrates no more skilled need at this time. D/C to HEP.      Karlee Kan, PT, DPT    Date: 2/11/2020

## 2020-02-24 ENCOUNTER — HOSPITAL ENCOUNTER (OUTPATIENT)
Dept: LAB | Facility: MEDICAL CENTER | Age: 50
End: 2020-02-24
Attending: FAMILY MEDICINE
Payer: COMMERCIAL

## 2020-02-24 DIAGNOSIS — R03.0 ELEVATED BLOOD PRESSURE READING: ICD-10-CM

## 2020-02-24 DIAGNOSIS — Z13.1 SCREENING FOR DIABETES MELLITUS: ICD-10-CM

## 2020-02-24 DIAGNOSIS — Z13.6 SCREENING FOR CARDIOVASCULAR CONDITION: ICD-10-CM

## 2020-02-24 LAB
ALBUMIN SERPL BCP-MCNC: 4.7 G/DL (ref 3.2–4.9)
ALBUMIN/GLOB SERPL: 1.7 G/DL
ALP SERPL-CCNC: 58 U/L (ref 30–99)
ALT SERPL-CCNC: 19 U/L (ref 2–50)
ANION GAP SERPL CALC-SCNC: 12 MMOL/L (ref 0–11.9)
AST SERPL-CCNC: 25 U/L (ref 12–45)
BASOPHILS # BLD AUTO: 0.8 % (ref 0–1.8)
BASOPHILS # BLD: 0.06 K/UL (ref 0–0.12)
BILIRUB SERPL-MCNC: 0.4 MG/DL (ref 0.1–1.5)
BUN SERPL-MCNC: 15 MG/DL (ref 8–22)
CALCIUM SERPL-MCNC: 9.7 MG/DL (ref 8.5–10.5)
CHLORIDE SERPL-SCNC: 102 MMOL/L (ref 96–112)
CHOLEST SERPL-MCNC: 211 MG/DL (ref 100–199)
CO2 SERPL-SCNC: 25 MMOL/L (ref 20–33)
CREAT SERPL-MCNC: 0.83 MG/DL (ref 0.5–1.4)
EOSINOPHIL # BLD AUTO: 0.13 K/UL (ref 0–0.51)
EOSINOPHIL NFR BLD: 1.7 % (ref 0–6.9)
ERYTHROCYTE [DISTWIDTH] IN BLOOD BY AUTOMATED COUNT: 46.3 FL (ref 35.9–50)
EST. AVERAGE GLUCOSE BLD GHB EST-MCNC: 94 MG/DL
FASTING STATUS PATIENT QL REPORTED: NORMAL
GLOBULIN SER CALC-MCNC: 2.8 G/DL (ref 1.9–3.5)
GLUCOSE SERPL-MCNC: 61 MG/DL (ref 65–99)
HBA1C MFR BLD: 4.9 % (ref 0–5.6)
HCT VFR BLD AUTO: 43.1 % (ref 42–52)
HDLC SERPL-MCNC: 52 MG/DL
HGB BLD-MCNC: 14.1 G/DL (ref 14–18)
IMM GRANULOCYTES # BLD AUTO: 0.02 K/UL (ref 0–0.11)
IMM GRANULOCYTES NFR BLD AUTO: 0.3 % (ref 0–0.9)
LDLC SERPL CALC-MCNC: 114 MG/DL
LYMPHOCYTES # BLD AUTO: 3.08 K/UL (ref 1–4.8)
LYMPHOCYTES NFR BLD: 41.1 % (ref 22–41)
MCH RBC QN AUTO: 31.1 PG (ref 27–33)
MCHC RBC AUTO-ENTMCNC: 32.7 G/DL (ref 33.7–35.3)
MCV RBC AUTO: 95.1 FL (ref 81.4–97.8)
MONOCYTES # BLD AUTO: 0.54 K/UL (ref 0–0.85)
MONOCYTES NFR BLD AUTO: 7.2 % (ref 0–13.4)
NEUTROPHILS # BLD AUTO: 3.67 K/UL (ref 1.82–7.42)
NEUTROPHILS NFR BLD: 48.9 % (ref 44–72)
NRBC # BLD AUTO: 0 K/UL
NRBC BLD-RTO: 0 /100 WBC
PLATELET # BLD AUTO: 369 K/UL (ref 164–446)
PMV BLD AUTO: 10 FL (ref 9–12.9)
POTASSIUM SERPL-SCNC: 4.1 MMOL/L (ref 3.6–5.5)
PROT SERPL-MCNC: 7.5 G/DL (ref 6–8.2)
RBC # BLD AUTO: 4.53 M/UL (ref 4.7–6.1)
SODIUM SERPL-SCNC: 139 MMOL/L (ref 135–145)
TRIGL SERPL-MCNC: 224 MG/DL (ref 0–149)
WBC # BLD AUTO: 7.5 K/UL (ref 4.8–10.8)

## 2020-02-24 PROCEDURE — 80061 LIPID PANEL: CPT

## 2020-02-24 PROCEDURE — 36415 COLL VENOUS BLD VENIPUNCTURE: CPT

## 2020-02-24 PROCEDURE — 85025 COMPLETE CBC W/AUTO DIFF WBC: CPT

## 2020-02-24 PROCEDURE — 83036 HEMOGLOBIN GLYCOSYLATED A1C: CPT

## 2020-02-24 PROCEDURE — 80053 COMPREHEN METABOLIC PANEL: CPT

## 2020-02-27 ENCOUNTER — OFFICE VISIT (OUTPATIENT)
Dept: MEDICAL GROUP | Facility: LAB | Age: 50
End: 2020-02-27
Payer: COMMERCIAL

## 2020-02-27 VITALS
HEART RATE: 70 BPM | OXYGEN SATURATION: 96 % | TEMPERATURE: 98 F | RESPIRATION RATE: 12 BRPM | DIASTOLIC BLOOD PRESSURE: 72 MMHG | BODY MASS INDEX: 28.89 KG/M2 | WEIGHT: 218 LBS | HEIGHT: 73 IN | SYSTOLIC BLOOD PRESSURE: 120 MMHG

## 2020-02-27 DIAGNOSIS — E78.5 HYPERLIPIDEMIA, UNSPECIFIED HYPERLIPIDEMIA TYPE: ICD-10-CM

## 2020-02-27 DIAGNOSIS — I10 ESSENTIAL HYPERTENSION: ICD-10-CM

## 2020-02-27 PROCEDURE — 99213 OFFICE O/P EST LOW 20 MIN: CPT | Performed by: FAMILY MEDICINE

## 2020-02-27 SDOH — HEALTH STABILITY: MENTAL HEALTH: HOW OFTEN DO YOU HAVE A DRINK CONTAINING ALCOHOL?: 2-3 TIMES A WEEK

## 2020-02-27 ASSESSMENT — ENCOUNTER SYMPTOMS
DIARRHEA: 0
SHORTNESS OF BREATH: 0
VOMITING: 0
NAUSEA: 0

## 2020-02-27 ASSESSMENT — PATIENT HEALTH QUESTIONNAIRE - PHQ9: CLINICAL INTERPRETATION OF PHQ2 SCORE: 0

## 2020-02-27 NOTE — PROGRESS NOTES
"Subjective:     CC: Follow-up labs, blood pressure    HPI:   Jaquan presents today to follow-up on recent labs and blood pressure.    Cholesterol  The 10-year ASCVD risk score (Nasracoby BEAN Jr., et al., 2013) is: 3.2%    Values used to calculate the score:      Age: 49 years      Sex: Male      Is Non- : No      Diabetic: No      Tobacco smoker: No      Systolic Blood Pressure: 120 mmHg      Is BP treated: No      HDL Cholesterol: 52 mg/dL      Total Cholesterol: 211 mg/dL   No current or past medication for cholesterol.    Blood pressure  Home BPs - 142/90, 158/102, 152/85. Cuff is his mom's and has been accurate at cardiologist in past.    Health Maintenance: Completed    Medications, past medical history, allergies, and social history have been reviewed and updated.    ROS:  Review of Systems   Respiratory: Negative for shortness of breath.    Cardiovascular: Negative for chest pain.   Gastrointestinal: Negative for diarrhea, nausea and vomiting.          Objective:       Exam:  /72 (BP Location: Left arm, Patient Position: Sitting, BP Cuff Size: Adult)   Pulse 70   Temp 36.7 °C (98 °F)   Resp 12   Ht 1.854 m (6' 1\")   Wt 98.9 kg (218 lb)   SpO2 96%   BMI 28.76 kg/m²  Body mass index is 28.76 kg/m².    Constitutional: Alert. Well appearing. No distress.  Skin: Warm, dry, good turgor, no visible rashes.  Eye: Equal, round and reactive to light, conjunctiva clear, lids normal.  ENMT: Moist mucous membranes. Normal dentition.  Respiratory: Normal effort. Lungs are clear to auscultation bilaterally.  Cardiovascular: Regular rate and rhythm. Normal S1/S2. No murmurs, rubs or gallops.   Neuro: Moves all four extremities. No facial droop.  Psych: Answers questions appropriately. Normal affect and mood.      Assessment & Plan:     49 y.o. male with the following -     1. Essential hypertension  New diagnosis today.  Home blood pressure appears to be averaging >140/90.  He is normotensive " here today.  He would like to hold on starting medication at this point.  Discussed diet and lifestyle recommendations and given handout on low-sodium diet.  We will follow-up in 1 to 2 months with blood pressure log and we will start medication if hypertension continues.  He will follow-up sooner or send message if blood pressures increase.    2. Hyperlipidemia, unspecified hyperlipidemia type  The 10-year ASCVD risk score (Nasracoby BEAN Jr., et al., 2013) is: 3.2%.  Discussed diet and lifestyle modifications.    Please note that this note was created using voice recognition software.

## 2020-03-19 ENCOUNTER — PATIENT MESSAGE (OUTPATIENT)
Dept: MEDICAL GROUP | Facility: LAB | Age: 50
End: 2020-03-19

## 2020-03-19 DIAGNOSIS — I10 ESSENTIAL HYPERTENSION: ICD-10-CM

## 2020-03-19 RX ORDER — AMLODIPINE BESYLATE 5 MG/1
5 TABLET ORAL DAILY
Qty: 30 TAB | Refills: 3 | Status: SHIPPED | OUTPATIENT
Start: 2020-03-19 | End: 2020-03-31 | Stop reason: SDUPTHER

## 2020-03-31 DIAGNOSIS — I10 ESSENTIAL HYPERTENSION: ICD-10-CM

## 2020-03-31 RX ORDER — AMLODIPINE BESYLATE 5 MG/1
5 TABLET ORAL DAILY
Qty: 30 TAB | Refills: 3 | Status: SHIPPED | OUTPATIENT
Start: 2020-03-31 | End: 2020-04-22 | Stop reason: SDUPTHER

## 2020-04-07 ENCOUNTER — OFFICE VISIT (OUTPATIENT)
Dept: MEDICAL GROUP | Facility: LAB | Age: 50
End: 2020-04-07
Payer: COMMERCIAL

## 2020-04-07 VITALS
WEIGHT: 218 LBS | OXYGEN SATURATION: 97 % | TEMPERATURE: 97.4 F | HEART RATE: 64 BPM | HEIGHT: 73 IN | DIASTOLIC BLOOD PRESSURE: 85 MMHG | RESPIRATION RATE: 12 BRPM | SYSTOLIC BLOOD PRESSURE: 156 MMHG | BODY MASS INDEX: 28.89 KG/M2

## 2020-04-07 DIAGNOSIS — I10 ESSENTIAL HYPERTENSION: ICD-10-CM

## 2020-04-07 ASSESSMENT — FIBROSIS 4 INDEX: FIB4 SCORE: 0.78

## 2020-04-07 NOTE — PROGRESS NOTES
"Telemedicine Visit: Established Patient     This encounter was conducted via {Platform used:60152::\"Zoom \"}.   Verbal consent was obtained. Patient's identity was verified.    Subjective:   CC: ***  Jaquan Fernandez is a 49 y.o. male presenting for evaluation and management of:    ***    ROS ***  Denies any recent fevers or chills. No nausea or vomiting. No chest pains or shortness of breath.     No Known Allergies    Current medicines (including changes today)  Current Outpatient Medications   Medication Sig Dispense Refill   • amLODIPine (NORVASC) 5 MG Tab Take 1 Tab by mouth every day. 30 Tab 3   • ibuprofen (MOTRIN) 200 MG Tab Take 400 mg by mouth 1 time daily as needed.       No current facility-administered medications for this visit.        Patient Active Problem List    Diagnosis Date Noted   • Neck pain 01/07/2019     Priority: High   • Hypertension 02/27/2020       No family history on file.    He  has no past medical history on file.  He  has a past surgical history that includes acl reconstruction and other orthopedic surgery.       Objective:   Vitals obtained by patient:  /85 (BP Location: Left arm, Patient Position: Sitting, BP Cuff Size: Adult)   Pulse 64   Temp 36.3 °C (97.4 °F)   Resp 12   Ht 1.854 m (6' 1\")   Wt 98.9 kg (218 lb)   SpO2 97%   BMI 28.76 kg/m²     Physical Exam:***  Constitutional: Alert, no distress, well-groomed.  Skin: No rashes in visible areas.  Eye: Round. Conjunctiva clear, lids normal. No icterus.   ENMT: Lips pink without lesions, good dentition, moist mucous membranes. Phonation normal.  Neck: No masses, no thyromegaly. Moves freely without pain.  CV: Pulse as reported by patient  Respiratory: Unlabored respiratory effort, no cough or audible wheeze  Psych: Alert and oriented x3, normal affect and mood.       Assessment and Plan:   The following treatment plan was discussed:     There are no diagnoses linked to this encounter.      Follow-up: No follow-ups on " file.

## 2020-04-07 NOTE — PROGRESS NOTES
Telephone Appointment Visit   As a means of avoiding spread of COVID-19, this visit is being conducted by telephone. This telephone visit was initiated by the patient and they verbally consented.    Time at start of call: 8:54    Reason for Call:  Medication Follow-up    Patient Comments / History:   Telephone visit done to discuss hypertension.  Was an issue at the pharmacy and patient just picked up amlodipine 2 days ago, 5 mg daily.  Blood pressure is still in 150s/80s.  He has worked on increasing exercise.    Labs / Images Reviewed   CMP 2/24/2020.    Assessment and Plan:     1. Essential hypertension  Started on amlodipine 5 mg 2 days ago.  He will continue to monitor at home and contact us the blood pressure log.  Can increase to 10 mg if needed.  Ran continued diet lifestyle modifications.    Follow-up: Return if symptoms worsen or fail to improve.    Time at end of call: 8:58  Total Time Spent: 4 minutes    Satya Hernandez M.D.

## 2020-04-22 ENCOUNTER — PATIENT MESSAGE (OUTPATIENT)
Dept: MEDICAL GROUP | Facility: LAB | Age: 50
End: 2020-04-22

## 2020-04-22 DIAGNOSIS — R42 DIZZINESS: ICD-10-CM

## 2020-04-22 DIAGNOSIS — I10 ESSENTIAL HYPERTENSION: ICD-10-CM

## 2020-04-22 RX ORDER — AMLODIPINE BESYLATE 10 MG/1
10 TABLET ORAL DAILY
Qty: 30 TAB | Refills: 0
Start: 2020-04-22 | End: 2020-05-18 | Stop reason: SDUPTHER

## 2020-04-28 ENCOUNTER — PHYSICAL THERAPY (OUTPATIENT)
Dept: PHYSICAL THERAPY | Facility: REHABILITATION | Age: 50
End: 2020-04-28
Attending: FAMILY MEDICINE
Payer: COMMERCIAL

## 2020-04-28 DIAGNOSIS — R42 DIZZINESS: ICD-10-CM

## 2020-04-28 PROCEDURE — 97162 PT EVAL MOD COMPLEX 30 MIN: CPT

## 2020-04-28 PROCEDURE — 97112 NEUROMUSCULAR REEDUCATION: CPT

## 2020-04-28 ASSESSMENT — ENCOUNTER SYMPTOMS
PAIN SCALE: 0
PAIN SCALE AT LOWEST: 0
PAIN TIMING: WHEN ACTIVE
PAIN SCALE AT HIGHEST: 7
ALLEVIATING FACTORS: REST

## 2020-04-28 NOTE — OP THERAPY EVALUATION
Outpatient Physical Therapy  INITIAL EVALUATION    [facility] Physical Therapy [facility]  [address]  [address]  [place] [address]  Phone:  [phone]  Fax:  [phone]    Date of Evaluation: 2020    Patient: Jaquan Fernandez  YOB: 1970  MRN: [id number]     Referring Provider: Satya Hernandez M.D.  [address]  [place] [address]   Referring Diagnosis Dizziness [R42]     Time Calculation  Start time: 0900  Stop time: 0948 Time Calculation (min): 48 minutes       Physical Therapy Occurrence Codes    Date of onset of impairment:  20   Date physical therapy care plan established or reviewed:  20   Date physical therapy treatment started:  20          Chief Complaint: Vertigo    Visit Diagnoses     ICD-10-CM   1. Dizziness R42         Subjective:   History of Present Illness:     Mechanism of injury:    Mr. Fernandez returns to  with flare up of his vertigo.  He was evaluated 20 for this issue.  Eval was unremarkable except for instability in cond 4 of the MCTSIB.  Given HEP, and follow up limited by high out of pocket cost for tx. Seen for d/c with all balance measures noted WNL.     This episode began in the afternoon on the .  Was working from home at the time and began to have an odd feeling of unsteadiness when in motion.  By the next day, it progressed to more of a spinning sensation that was present again in motion.  Lasted about 2 days and steadily improved over the week.  Now feeling like he has returned to his baseline. No illness coinciding. Has never been established with ENT, no appt scheduled.   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  Hobbies: fly fishing, skiing  Headaches:  no headaches  Ear problems: none  Sleep disturbance:  Not disrupted (Could feel it when rolling in bed last week, but can't ID a side that was a bother)  Pain:     Current pain ratin    At best pain ratin    At worst  pain ratin (when active, nausea no vomiting)    Pain timing:  When active    Relieving factors:  Rest    Progression:  Stable  Diagnostic Tests:     MRI studies: normal      CT scan: normal    Treatments:     None    Patient Goals:     Other patient goals:  Resolve vertigo.       History reviewed. No pertinent past medical history.  Past Surgical History:   Procedure Laterality Date   • ACL RECONSTRUCTION     • OTHER ORTHOPEDIC SURGERY      Knee     Social History     Tobacco Use   • Smoking status: Never Smoker   • Smokeless tobacco: Never Used   Substance Use Topics   • Alcohol use: Yes     Frequency: 2-3 times a week     Family and Occupational History     Socioeconomic History   • Marital status:      Spouse name: Not on file   • Number of children: Not on file   • Years of education: Not on file   • Highest education level: Not on file   Occupational History   • Not on file       Objective   Objective:     Gait:     Comments: Unremarkable  Vestibulospinal Exam:     MCTSIB:         Firm, eyes open: within normal limits        Firm, eyes closed: within normal limits        Foam, eyes open: within normal limits        Foam, eyes closed: within normal limits        Composite Score: within normal limits        Oculomotor Exam:         Details: No spontaneous nystagmus central gaze          Details: No spontaneous nystagmus eccentric gaze    No saccadic eye movements    Smooth pursuit present    Convergence:        Normal convergence    No skew  Active Range of Motion:     Active range of motion comments: C/S AROM is WNL.  Without motion sensitivity.   Limb Ataxia Exam:     Finger-to-Nose:         Intention tremor: none    Dysdiadochokinesia: none.  Strength Exam:     Upper extremities within functional limits    Lower extremities within functional limits  Reflex Exam:        Deep Tendon Reflexes:         Left L4 (Patellar): normal (2+)        Left S1 (Achilles): normal (2+)        Right L4 (Patellar):  normal (2+)        Right S1 (Achilles): normal (2+)  Sensation Tests:     Left Light Touch Sensation:         All left lumbar dermatomes intact       Right Light Touch Sensation:         All right lumbar dermatomes intact        Vestibulo-Ocular Exam:   Normal head thrust test    Comments: VORx1 is asx  BPPV Exam:     Normal [place]-Hallpike    Normal straight head-hanging test    Normal roll test    Comments: First L hallpike showed a very mild L upward torsional nystagmus, which did not fatigue with prolonged hold.  Resolved completely on 2nd attempt with frenzel lenses  Breathing-Related Tests:     Normal hyperventilation test    Normal Valsalva test      Therapeutic Treatments and Modalities:     1. Functional Training, Self Care (CPT 09848), Education: Review of BPPV vs neuritis vs menier's.  Discussed journaling sx to help with dx and benefits of specialist consult at this time. Review HEP: foam balance EC with head shake.     Time-based treatments/modalities:  Neuromusc re-ed, balance, coor, post minutes (CPT 44475): 15 minutes       Assessment, Response and Plan:   Other Impairments:  None.    Assessment details:  Mr. Fernandez is a 49 y.o who is returning after another flare up of his vertigo.  Today's evaluation reveals normal vestibular function in regard to VOR, balance control and motion tolerance.  His history is consistent with a peripheral issue, possibly Menier's or vestibular neuritis, but is showing full recovery back to baseline between episodes.  Pt would benefit from specialist consult with either ENT or an otoneurologist to better dx and manage the episodes.  Pt does not have skilled need at this time, but will hold chart open for 30 days in case of relapse and need for more follow up.   Goals:   Short Term Goals:   See LTGs      Long Term Goals:    - No vertigo flare up x 4 weeks  - No LOB on foam with EC head shakes  Long term goal time span:  4-6 weeks    Plan:   Therapy options:  Physical therapy  treatment to continue  Planned therapy interventions:  Functional Training, Self Care (CPT 97128), Therapeutic Activities (CPT 56752), Therapeutic Exercise (CPT 25401) and Neuromuscular Re-education (CPT 78116)  Frequency:  1x week  Duration in weeks:  8  Discussed with:  Patient      Functional Assessment Used  Dizziness Handicap Inventory - Physical Items Score: 22  Dizziness Handicap Inventory - Emotional Items Score: 16  Dizziness Handicap Inventory - Functional Items Score: 16  Dizziness Handicap Inventory - Total Score: 54    * answered as if in the middle of an episode.   Referring provider co-signature:  I have reviewed this plan of care and my co-signature certifies the need for services.  Certification Dates:   From 04/28/20   To 07/21/20    Physician Signature: ________________________________ Date: ______________

## 2020-05-04 ENCOUNTER — APPOINTMENT (OUTPATIENT)
Dept: PHYSICAL THERAPY | Facility: REHABILITATION | Age: 50
End: 2020-05-04
Attending: FAMILY MEDICINE
Payer: COMMERCIAL

## 2020-05-07 ENCOUNTER — APPOINTMENT (OUTPATIENT)
Dept: PHYSICAL THERAPY | Facility: REHABILITATION | Age: 50
End: 2020-05-07
Attending: FAMILY MEDICINE
Payer: COMMERCIAL

## 2020-05-11 ENCOUNTER — APPOINTMENT (OUTPATIENT)
Dept: PHYSICAL THERAPY | Facility: REHABILITATION | Age: 50
End: 2020-05-11
Attending: FAMILY MEDICINE
Payer: COMMERCIAL

## 2020-05-14 ENCOUNTER — APPOINTMENT (OUTPATIENT)
Dept: PHYSICAL THERAPY | Facility: REHABILITATION | Age: 50
End: 2020-05-14
Attending: FAMILY MEDICINE
Payer: COMMERCIAL

## 2020-05-18 DIAGNOSIS — I10 ESSENTIAL HYPERTENSION: ICD-10-CM

## 2020-05-18 RX ORDER — AMLODIPINE BESYLATE 10 MG/1
10 TABLET ORAL DAILY
Qty: 30 TAB | Refills: 6 | Status: SHIPPED | OUTPATIENT
Start: 2020-05-18 | End: 2020-10-27

## 2020-05-18 NOTE — TELEPHONE ENCOUNTER
Received request via: Patient    Was the patient seen in the last year in this department? Yes LOV 4/7/2020    Does the patient have an active prescription (recently filled or refills available) for medication(s) requested? No

## 2020-06-23 ENCOUNTER — TELEPHONE (OUTPATIENT)
Dept: PHYSICAL THERAPY | Facility: REHABILITATION | Age: 50
End: 2020-06-23

## 2020-06-23 NOTE — OP THERAPY DISCHARGE SUMMARY
Outpatient Physical Therapy  DISCHARGE SUMMARY NOTE      [address]  [address]  [place] [address]  Phone:  [phone]  Fax:  [phone]    Date of Visit: 06/23/2020    Patient: Jaquan Fernandez  YOB: 1970  MRN: [id number]     Referring Provider: No referring provider defined for this encounter.   Referring Diagnosis No admission diagnoses are documented for this encounter.         Functional Assessment Used        Your patient is being discharged from Physical Therapy with the following comments:   · other    Comments:  Mr. Fernandez was seen only for vestibular eval. He demonstrated vestibular function WNL and no need for PT at that time.  Chart held open in case of flare up, but further follow up was not needed.       Recommendations:  D/C due to lapse in care.  Welcome to return with new Rx if indicated.     Karlee Kan, PT, DPT    Date: 6/23/2020

## 2020-10-27 DIAGNOSIS — I10 ESSENTIAL HYPERTENSION: ICD-10-CM

## 2020-10-27 RX ORDER — AMLODIPINE BESYLATE 10 MG/1
TABLET ORAL
Qty: 90 TAB | Refills: 2 | Status: SHIPPED | OUTPATIENT
Start: 2020-10-27 | End: 2021-03-01 | Stop reason: SDUPTHER

## 2021-03-01 DIAGNOSIS — I10 ESSENTIAL HYPERTENSION: ICD-10-CM

## 2021-03-01 NOTE — TELEPHONE ENCOUNTER
Received request via: Pharmacy    Was the patient seen in the last year in this department? Yes    Does the patient have an active prescription (recently filled or refills available) for medication(s) requested? No     amLODIPine (NORVASC) 10 MG Tab

## 2021-03-02 RX ORDER — AMLODIPINE BESYLATE 10 MG/1
10 TABLET ORAL
Qty: 90 TABLET | Refills: 2 | Status: SHIPPED | OUTPATIENT
Start: 2021-03-02 | End: 2021-10-26 | Stop reason: SDUPTHER

## 2021-04-09 ENCOUNTER — APPOINTMENT (RX ONLY)
Dept: URBAN - METROPOLITAN AREA CLINIC 4 | Facility: CLINIC | Age: 51
Setting detail: DERMATOLOGY
End: 2021-04-09

## 2021-04-09 DIAGNOSIS — L82.1 OTHER SEBORRHEIC KERATOSIS: ICD-10-CM

## 2021-04-09 DIAGNOSIS — D18.0 HEMANGIOMA: ICD-10-CM

## 2021-04-09 DIAGNOSIS — Z71.89 OTHER SPECIFIED COUNSELING: ICD-10-CM

## 2021-04-09 PROBLEM — D18.01 HEMANGIOMA OF SKIN AND SUBCUTANEOUS TISSUE: Status: ACTIVE | Noted: 2021-04-09

## 2021-04-09 PROCEDURE — ? COUNSELING

## 2021-04-09 PROCEDURE — 99203 OFFICE O/P NEW LOW 30 MIN: CPT

## 2021-04-09 ASSESSMENT — LOCATION DETAILED DESCRIPTION DERM
LOCATION DETAILED: RIGHT PROXIMAL DORSAL FOREARM
LOCATION DETAILED: RIGHT CLAVICULAR SKIN
LOCATION DETAILED: RIGHT LATERAL ABDOMEN
LOCATION DETAILED: LEFT MID-UPPER BACK
LOCATION DETAILED: RIGHT SUPERIOR FOREHEAD
LOCATION DETAILED: LEFT DISTAL DORSAL FOREARM
LOCATION DETAILED: RIGHT SUPERIOR UPPER BACK
LOCATION DETAILED: LEFT SUPERIOR MEDIAL LOWER BACK
LOCATION DETAILED: LEFT INFERIOR UPPER BACK
LOCATION DETAILED: RIGHT INFERIOR LATERAL MIDBACK
LOCATION DETAILED: LEFT RIB CAGE
LOCATION DETAILED: PERIUMBILICAL SKIN

## 2021-04-09 ASSESSMENT — LOCATION SIMPLE DESCRIPTION DERM
LOCATION SIMPLE: LEFT LOWER BACK
LOCATION SIMPLE: RIGHT FOREARM
LOCATION SIMPLE: LEFT FOREARM
LOCATION SIMPLE: LEFT UPPER BACK
LOCATION SIMPLE: RIGHT LOWER BACK
LOCATION SIMPLE: ABDOMEN
LOCATION SIMPLE: RIGHT UPPER BACK
LOCATION SIMPLE: RIGHT FOREHEAD
LOCATION SIMPLE: RIGHT CLAVICULAR SKIN

## 2021-04-09 ASSESSMENT — LOCATION ZONE DERM
LOCATION ZONE: FACE
LOCATION ZONE: TRUNK
LOCATION ZONE: ARM

## 2021-10-26 DIAGNOSIS — I10 ESSENTIAL HYPERTENSION: ICD-10-CM

## 2021-10-26 RX ORDER — AMLODIPINE BESYLATE 10 MG/1
10 TABLET ORAL
Qty: 90 TABLET | Refills: 0 | Status: SHIPPED | OUTPATIENT
Start: 2021-10-26 | End: 2021-11-18

## 2021-10-26 NOTE — TELEPHONE ENCOUNTER
Received request via: Pharmacy    Was the patient seen in the last year in this department? No   4/7/2020  Does the patient have an active prescription (recently filled or refills available) for medication(s) requested? No

## 2021-11-09 DIAGNOSIS — Z00.00 WELL ADULT EXAM: ICD-10-CM

## 2021-11-10 ENCOUNTER — HOSPITAL ENCOUNTER (OUTPATIENT)
Dept: LAB | Facility: MEDICAL CENTER | Age: 51
End: 2021-11-10
Attending: FAMILY MEDICINE
Payer: COMMERCIAL

## 2021-11-10 DIAGNOSIS — Z00.00 WELL ADULT EXAM: ICD-10-CM

## 2021-11-10 LAB
ALBUMIN SERPL BCP-MCNC: 4.9 G/DL (ref 3.2–4.9)
ALBUMIN/GLOB SERPL: 2.1 G/DL
ALP SERPL-CCNC: 73 U/L (ref 30–99)
ALT SERPL-CCNC: 15 U/L (ref 2–50)
ANION GAP SERPL CALC-SCNC: 10 MMOL/L (ref 7–16)
AST SERPL-CCNC: 17 U/L (ref 12–45)
BASOPHILS # BLD AUTO: 1 % (ref 0–1.8)
BASOPHILS # BLD: 0.06 K/UL (ref 0–0.12)
BILIRUB SERPL-MCNC: 0.3 MG/DL (ref 0.1–1.5)
BUN SERPL-MCNC: 18 MG/DL (ref 8–22)
CALCIUM SERPL-MCNC: 9.4 MG/DL (ref 8.5–10.5)
CHLORIDE SERPL-SCNC: 106 MMOL/L (ref 96–112)
CHOLEST SERPL-MCNC: 177 MG/DL (ref 100–199)
CO2 SERPL-SCNC: 24 MMOL/L (ref 20–33)
CREAT SERPL-MCNC: 0.79 MG/DL (ref 0.5–1.4)
EOSINOPHIL # BLD AUTO: 0.07 K/UL (ref 0–0.51)
EOSINOPHIL NFR BLD: 1.2 % (ref 0–6.9)
ERYTHROCYTE [DISTWIDTH] IN BLOOD BY AUTOMATED COUNT: 45.8 FL (ref 35.9–50)
FASTING STATUS PATIENT QL REPORTED: NORMAL
GLOBULIN SER CALC-MCNC: 2.3 G/DL (ref 1.9–3.5)
GLUCOSE SERPL-MCNC: 91 MG/DL (ref 65–99)
HCT VFR BLD AUTO: 44.3 % (ref 42–52)
HDLC SERPL-MCNC: 48 MG/DL
HGB BLD-MCNC: 15 G/DL (ref 14–18)
IMM GRANULOCYTES # BLD AUTO: 0.01 K/UL (ref 0–0.11)
IMM GRANULOCYTES NFR BLD AUTO: 0.2 % (ref 0–0.9)
LDLC SERPL CALC-MCNC: 114 MG/DL
LYMPHOCYTES # BLD AUTO: 2.28 K/UL (ref 1–4.8)
LYMPHOCYTES NFR BLD: 38.9 % (ref 22–41)
MCH RBC QN AUTO: 31 PG (ref 27–33)
MCHC RBC AUTO-ENTMCNC: 33.9 G/DL (ref 33.7–35.3)
MCV RBC AUTO: 91.5 FL (ref 81.4–97.8)
MONOCYTES # BLD AUTO: 0.43 K/UL (ref 0–0.85)
MONOCYTES NFR BLD AUTO: 7.3 % (ref 0–13.4)
NEUTROPHILS # BLD AUTO: 3.01 K/UL (ref 1.82–7.42)
NEUTROPHILS NFR BLD: 51.4 % (ref 44–72)
NRBC # BLD AUTO: 0 K/UL
NRBC BLD-RTO: 0 /100 WBC
PLATELET # BLD AUTO: 347 K/UL (ref 164–446)
PMV BLD AUTO: 10.7 FL (ref 9–12.9)
POTASSIUM SERPL-SCNC: 4.5 MMOL/L (ref 3.6–5.5)
PROT SERPL-MCNC: 7.2 G/DL (ref 6–8.2)
RBC # BLD AUTO: 4.84 M/UL (ref 4.7–6.1)
SODIUM SERPL-SCNC: 140 MMOL/L (ref 135–145)
TRIGL SERPL-MCNC: 73 MG/DL (ref 0–149)
WBC # BLD AUTO: 5.9 K/UL (ref 4.8–10.8)

## 2021-11-10 PROCEDURE — 80061 LIPID PANEL: CPT

## 2021-11-10 PROCEDURE — 36415 COLL VENOUS BLD VENIPUNCTURE: CPT

## 2021-11-10 PROCEDURE — 80053 COMPREHEN METABOLIC PANEL: CPT

## 2021-11-10 PROCEDURE — 85025 COMPLETE CBC W/AUTO DIFF WBC: CPT

## 2021-11-18 ENCOUNTER — OFFICE VISIT (OUTPATIENT)
Dept: MEDICAL GROUP | Facility: LAB | Age: 51
End: 2021-11-18
Payer: COMMERCIAL

## 2021-11-18 VITALS
HEIGHT: 73 IN | RESPIRATION RATE: 12 BRPM | BODY MASS INDEX: 26.37 KG/M2 | OXYGEN SATURATION: 97 % | WEIGHT: 199 LBS | SYSTOLIC BLOOD PRESSURE: 106 MMHG | HEART RATE: 56 BPM | TEMPERATURE: 97 F | DIASTOLIC BLOOD PRESSURE: 66 MMHG

## 2021-11-18 DIAGNOSIS — M1A.0710 IDIOPATHIC CHRONIC GOUT OF RIGHT ANKLE WITHOUT TOPHUS: ICD-10-CM

## 2021-11-18 DIAGNOSIS — Z23 NEED FOR VACCINATION: ICD-10-CM

## 2021-11-18 DIAGNOSIS — H81.10 BENIGN RECURRENT VERTIGO: ICD-10-CM

## 2021-11-18 DIAGNOSIS — Z00.00 WELL ADULT EXAM: ICD-10-CM

## 2021-11-18 PROBLEM — I10 HYPERTENSION: Status: RESOLVED | Noted: 2020-02-27 | Resolved: 2021-11-18

## 2021-11-18 PROBLEM — M54.2 NECK PAIN: Status: RESOLVED | Noted: 2019-01-07 | Resolved: 2021-11-18

## 2021-11-18 PROCEDURE — 99396 PREV VISIT EST AGE 40-64: CPT | Mod: 25 | Performed by: FAMILY MEDICINE

## 2021-11-18 PROCEDURE — 90686 IIV4 VACC NO PRSV 0.5 ML IM: CPT | Performed by: FAMILY MEDICINE

## 2021-11-18 PROCEDURE — 90471 IMMUNIZATION ADMIN: CPT | Performed by: FAMILY MEDICINE

## 2021-11-18 ASSESSMENT — PATIENT HEALTH QUESTIONNAIRE - PHQ9: CLINICAL INTERPRETATION OF PHQ2 SCORE: 0

## 2021-11-18 ASSESSMENT — FIBROSIS 4 INDEX: FIB4 SCORE: 0.66

## 2021-11-18 NOTE — PROGRESS NOTES
Subjective:     CC:   Chief Complaint   Patient presents with   • Annual Exam       HPI:   Jaquan Fernandez is a 51 y.o. male who presents for an annual exam. He is feeling well and has no complaints.    Last colonoscopy: 2020  Last Tdap: 2019  Regular exercise: Yes  Diet: healthy, recently loss significant weight  The 10-year ASCVD risk score (Nasra GENEVA Jr., et al., 2013) is: 2.7%    Gout -had bad flare to right ankle and elbow about 2 months ago but no other flares within the last year.    Vertigo -had 2 episodes in the last year, last episode lasted about 6 or 7 days.  Brief episodes of vertigo interspersed with feeling better but never quite feels normal during episode.  Previously normal MRI.  He previously worked with vestibular PT who thought likely BPPV but was never able to be seen during an acute episode      He  has no past medical history on file.  He  has a past surgical history that includes acl reconstruction and other orthopedic surgery.  No family history on file.  Social History     Tobacco Use   • Smoking status: Never Smoker   • Smokeless tobacco: Never Used   Vaping Use   • Vaping Use: Never used   Substance Use Topics   • Alcohol use: Yes   • Drug use: No       Patient Active Problem List    Diagnosis Date Noted   • Hypertension 02/27/2020   • Neck pain 01/07/2019       Current Outpatient Medications   Medication Sig Dispense Refill   • ibuprofen (MOTRIN) 200 MG Tab Take 400 mg by mouth 1 time daily as needed.     • amLODIPine (NORVASC) 10 MG Tab Take 1 Tablet by mouth every day. (Patient not taking: Reported on 11/18/2021) 90 Tablet 0     No current facility-administered medications for this visit.    (including changes today)  Allergies: Patient has no known allergies.    Review of Systems   Constitutional: Negative for fever, chills and malaise/fatigue.   HENT: Negative for congestion.    Eyes: Negative for pain.   Respiratory: Negative for cough and shortness of breath.    Cardiovascular:  "Negative for leg swelling.   Gastrointestinal: Negative for nausea, vomiting, abdominal pain and diarrhea.   Genitourinary: Negative for dysuria and hematuria.   Skin: Negative for rash.   Neurological: Negative for dizziness, focal weakness and headaches.   Endo/Heme/Allergies: Does not bruise/bleed easily.   Psychiatric/Behavioral: Negative for depression.  The patient is not nervous/anxious.      Objective:     /66 (BP Location: Right arm, Patient Position: Sitting, BP Cuff Size: Adult)   Pulse (!) 56   Temp 36.1 °C (97 °F)   Resp 12   Ht 1.854 m (6' 1\")   Wt 90.3 kg (199 lb)   SpO2 97%   BMI 26.25 kg/m²   Body mass index is 26.25 kg/m².  Wt Readings from Last 4 Encounters:   11/18/21 90.3 kg (199 lb)   04/07/20 98.9 kg (218 lb)   02/27/20 98.9 kg (218 lb)   12/24/19 98.9 kg (218 lb)       Physical Exam:  Constitutional: Well-developed and well-nourished. Not diaphoretic. No distress.   Skin: Skin is warm and dry. No rash noted.  Head: Atraumatic without lesions.  Eyes: Conjunctivae and extraocular motions are normal. Pupils are equal, round, and reactive to light. No scleral icterus.   Ears:  External ears unremarkable.  Nose: Nares patent. Septum midline.   Neck: Supple, trachea midline. Normal range of motion. No thyromegaly present. No lymphadenopathy--cervical or supraclavicular.  Cardiovascular: Regular rate and rhythm, S1 and S2 without murmur, rubs, or gallops.    Chest: Effort normal. Clear to auscultation throughout.  Abdomen: Soft, non-tender, and without distention  Extremities: No cyanosis, clubbing, erythema, nor edema. Distal pulses intact and symmetric.   Musculoskeletal: All major joints AROM full in all directions without pain.  Neurological: Alert and oriented x 3.No cranial nerve deficit. Sensation to extremities grossly intact to light touch.  Psychiatric:  Behavior, mood, and affect are appropriate.    Assessment and Plan:     1. Well adult exam  Health maintenance reviewed and " updated.  Age-appropriate anticipatory guidance provided.  Labs done recently.    2. Idiopathic chronic gout of right ankle without tophus  Having about 1 flare per year, will check uric acid.  He did recently make significant changes with weight loss.  - URIC ACID; Future    3. Benign recurrent vertigo  Likely BPPV.  Negative MRI. Has a few episodes per year.  Discussed trying to get into vestibular PT during an acute episode, he is also given handout on home Epley maneuvers.    4. Need for vaccination  - INFLUENZA VACCINE QUAD INJ (PF)    Follow-up: Return in about 1 year (around 11/18/2022).    Please note that this note was created using voice recognition software.

## 2022-04-14 ENCOUNTER — APPOINTMENT (RX ONLY)
Dept: URBAN - METROPOLITAN AREA CLINIC 4 | Facility: CLINIC | Age: 52
Setting detail: DERMATOLOGY
End: 2022-04-14

## 2022-04-14 DIAGNOSIS — L81.4 OTHER MELANIN HYPERPIGMENTATION: ICD-10-CM

## 2022-04-14 DIAGNOSIS — Z71.89 OTHER SPECIFIED COUNSELING: ICD-10-CM

## 2022-04-14 DIAGNOSIS — D22 MELANOCYTIC NEVI: ICD-10-CM

## 2022-04-14 DIAGNOSIS — D18.0 HEMANGIOMA: ICD-10-CM

## 2022-04-14 DIAGNOSIS — L82.1 OTHER SEBORRHEIC KERATOSIS: ICD-10-CM

## 2022-04-14 PROBLEM — D22.5 MELANOCYTIC NEVI OF TRUNK: Status: ACTIVE | Noted: 2022-04-14

## 2022-04-14 PROBLEM — D22.61 MELANOCYTIC NEVI OF RIGHT UPPER LIMB, INCLUDING SHOULDER: Status: ACTIVE | Noted: 2022-04-14

## 2022-04-14 PROBLEM — D18.01 HEMANGIOMA OF SKIN AND SUBCUTANEOUS TISSUE: Status: ACTIVE | Noted: 2022-04-14

## 2022-04-14 PROBLEM — D22.72 MELANOCYTIC NEVI OF LEFT LOWER LIMB, INCLUDING HIP: Status: ACTIVE | Noted: 2022-04-14

## 2022-04-14 PROBLEM — D22.71 MELANOCYTIC NEVI OF RIGHT LOWER LIMB, INCLUDING HIP: Status: ACTIVE | Noted: 2022-04-14

## 2022-04-14 PROBLEM — D22.62 MELANOCYTIC NEVI OF LEFT UPPER LIMB, INCLUDING SHOULDER: Status: ACTIVE | Noted: 2022-04-14

## 2022-04-14 PROCEDURE — ? COUNSELING

## 2022-04-14 PROCEDURE — 99213 OFFICE O/P EST LOW 20 MIN: CPT

## 2022-04-14 ASSESSMENT — LOCATION DETAILED DESCRIPTION DERM
LOCATION DETAILED: RIGHT ANTERIOR DISTAL UPPER ARM
LOCATION DETAILED: LEFT SUPERIOR MEDIAL MIDBACK
LOCATION DETAILED: LEFT SCAPHA
LOCATION DETAILED: LEFT INFERIOR CENTRAL MALAR CHEEK
LOCATION DETAILED: RIGHT VENTRAL PROXIMAL FOREARM
LOCATION DETAILED: RIGHT RADIAL DORSAL HAND
LOCATION DETAILED: RIGHT MEDIAL INFERIOR CHEST
LOCATION DETAILED: LEFT RADIAL DORSAL HAND
LOCATION DETAILED: RIGHT PROXIMAL CALF
LOCATION DETAILED: LEFT PROXIMAL CALF
LOCATION DETAILED: LEFT ANTECUBITAL SKIN
LOCATION DETAILED: LEFT VENTRAL DISTAL FOREARM
LOCATION DETAILED: RIGHT SUPERIOR HELIX
LOCATION DETAILED: XIPHOID
LOCATION DETAILED: RIGHT MEDIAL UPPER BACK
LOCATION DETAILED: RIGHT ANTERIOR PROXIMAL UPPER ARM
LOCATION DETAILED: LEFT POPLITEAL SKIN
LOCATION DETAILED: LEFT ANTERIOR DISTAL UPPER ARM
LOCATION DETAILED: RIGHT DISTAL POSTERIOR THIGH
LOCATION DETAILED: EPIGASTRIC SKIN
LOCATION DETAILED: PERIUMBILICAL SKIN
LOCATION DETAILED: LEFT VENTRAL DISTAL FOREARM
LOCATION DETAILED: LEFT DISTAL POSTERIOR THIGH
LOCATION DETAILED: RIGHT VENTRAL DISTAL FOREARM
LOCATION DETAILED: RIGHT INFERIOR MEDIAL MIDBACK
LOCATION DETAILED: LEFT MEDIAL FOREHEAD
LOCATION DETAILED: RIGHT POPLITEAL SKIN
LOCATION DETAILED: RIGHT INFERIOR UPPER BACK
LOCATION DETAILED: RIGHT SUPERIOR PARIETAL SCALP

## 2022-04-14 ASSESSMENT — LOCATION SIMPLE DESCRIPTION DERM
LOCATION SIMPLE: LEFT FOREHEAD
LOCATION SIMPLE: LEFT ELBOW
LOCATION SIMPLE: RIGHT UPPER BACK
LOCATION SIMPLE: RIGHT CALF
LOCATION SIMPLE: CHEST
LOCATION SIMPLE: LEFT EAR
LOCATION SIMPLE: RIGHT HAND
LOCATION SIMPLE: LEFT POSTERIOR THIGH
LOCATION SIMPLE: RIGHT EAR
LOCATION SIMPLE: LEFT POPLITEAL SKIN
LOCATION SIMPLE: LEFT FOREARM
LOCATION SIMPLE: SCALP
LOCATION SIMPLE: RIGHT POSTERIOR THIGH
LOCATION SIMPLE: RIGHT UPPER ARM
LOCATION SIMPLE: RIGHT FOREARM
LOCATION SIMPLE: LEFT CHEEK
LOCATION SIMPLE: LEFT UPPER ARM
LOCATION SIMPLE: LEFT FOREARM
LOCATION SIMPLE: ABDOMEN
LOCATION SIMPLE: RIGHT LOWER BACK
LOCATION SIMPLE: LEFT CALF
LOCATION SIMPLE: LEFT HAND
LOCATION SIMPLE: LEFT LOWER BACK
LOCATION SIMPLE: RIGHT POPLITEAL SKIN

## 2022-04-14 ASSESSMENT — LOCATION ZONE DERM
LOCATION ZONE: SCALP
LOCATION ZONE: HAND
LOCATION ZONE: ARM
LOCATION ZONE: TRUNK
LOCATION ZONE: FACE
LOCATION ZONE: ARM
LOCATION ZONE: LEG
LOCATION ZONE: EAR

## 2022-11-16 ENCOUNTER — OFFICE VISIT (OUTPATIENT)
Dept: URGENT CARE | Facility: CLINIC | Age: 52
End: 2022-11-16
Payer: COMMERCIAL

## 2022-11-16 VITALS
HEIGHT: 73 IN | DIASTOLIC BLOOD PRESSURE: 82 MMHG | OXYGEN SATURATION: 97 % | HEART RATE: 73 BPM | WEIGHT: 195 LBS | TEMPERATURE: 98.3 F | BODY MASS INDEX: 25.84 KG/M2 | RESPIRATION RATE: 16 BRPM | SYSTOLIC BLOOD PRESSURE: 124 MMHG

## 2022-11-16 DIAGNOSIS — R05.1 ACUTE COUGH: ICD-10-CM

## 2022-11-16 DIAGNOSIS — R09.81 NASAL CONGESTION: ICD-10-CM

## 2022-11-16 DIAGNOSIS — J06.9 UPPER RESPIRATORY INFECTION, ACUTE: Primary | ICD-10-CM

## 2022-11-16 DIAGNOSIS — J02.9 SORE THROAT: ICD-10-CM

## 2022-11-16 LAB
EXTERNAL QUALITY CONTROL: NORMAL
INT CON NEG: NORMAL
INT CON POS: NORMAL
SARS-COV+SARS-COV-2 AG RESP QL IA.RAPID: NEGATIVE

## 2022-11-16 PROCEDURE — 99214 OFFICE O/P EST MOD 30 MIN: CPT | Performed by: NURSE PRACTITIONER

## 2022-11-16 PROCEDURE — 87426 SARSCOV CORONAVIRUS AG IA: CPT | Performed by: NURSE PRACTITIONER

## 2022-11-16 ASSESSMENT — ENCOUNTER SYMPTOMS
FEVER: 0
SINUS PAIN: 0
PALPITATIONS: 0
SORE THROAT: 1
HEADACHES: 0
MYALGIAS: 0
STRIDOR: 0
CHILLS: 1
COUGH: 0
EYE REDNESS: 0
EYE DISCHARGE: 0

## 2022-11-16 ASSESSMENT — FIBROSIS 4 INDEX: FIB4 SCORE: 0.67

## 2022-11-16 NOTE — PROGRESS NOTES
Jaquan Fernandez is a 52 y.o. male who presents for Sore Throat (X4 days ), Nasal Congestion, and Headache      HPI  This is a new problem. Jaquan Fernandez is a 52 y.o. patient who presents to urgent care with c/o: sore throat, nasal congestion and headache for 4 days. Symptoms are not improving. Unusual for him to get sick. No ill contact exposures. Has not measured a temperature. Feeling fatigued and unwell.   Treatments tried: OTC cough and cold medication. Not really helping much but controls symptoms.   Denies NVD, sob, c/p.    No other aggravating or alleviating factors.       Review of Systems   Constitutional:  Positive for chills and malaise/fatigue. Negative for fever.   HENT:  Positive for congestion and sore throat. Negative for ear discharge, ear pain, nosebleeds and sinus pain.    Eyes:  Negative for discharge and redness.   Respiratory:  Negative for cough and stridor.    Cardiovascular:  Negative for chest pain, palpitations and leg swelling.   Musculoskeletal:  Negative for myalgias.   Neurological:  Negative for headaches.    See HPI    Allergies:     No Known Allergies    PMSFS Hx:  History reviewed. No pertinent past medical history.  Past Surgical History:   Procedure Laterality Date    OTHER ORTHOPEDIC SURGERY      Knee    RECONSTRUCTION, KNEE, ACL       History reviewed. No pertinent family history.  Social History     Tobacco Use    Smoking status: Never    Smokeless tobacco: Never   Substance Use Topics    Alcohol use: Yes     Comment: rarely       Problems:   Patient Active Problem List   Diagnosis    Benign recurrent vertigo    Idiopathic chronic gout of right ankle without tophus       Medications:   Current Outpatient Medications on File Prior to Visit   Medication Sig Dispense Refill    ibuprofen (MOTRIN) 200 MG Tab Take 400 mg by mouth 1 time daily as needed. (Patient not taking: Reported on 11/16/2022)       No current facility-administered medications on file prior to visit.           "Objective:     /82   Pulse 73   Temp 36.8 °C (98.3 °F) (Temporal)   Resp 16   Ht 1.854 m (6' 1\")   Wt 88.5 kg (195 lb)   SpO2 97%   BMI 25.73 kg/m²     Physical Exam  Vitals and nursing note reviewed.   Constitutional:       General: He is not in acute distress.     Appearance: He is well-developed. He is not toxic-appearing.   HENT:      Head: Normocephalic.      Right Ear: Hearing, tympanic membrane, ear canal and external ear normal.      Left Ear: Hearing, tympanic membrane, ear canal and external ear normal.      Nose: Mucosal edema and rhinorrhea present.      Right Sinus: No maxillary sinus tenderness or frontal sinus tenderness.      Left Sinus: No maxillary sinus tenderness or frontal sinus tenderness.      Mouth/Throat:      Mouth: Mucous membranes are moist.      Pharynx: Uvula midline. Posterior oropharyngeal erythema present. No oropharyngeal exudate or uvula swelling.      Tonsils: No tonsillar abscesses.   Eyes:      General: Lids are normal.      Conjunctiva/sclera: Conjunctivae normal.      Pupils: Pupils are equal, round, and reactive to light.   Neck:      Trachea: Trachea and phonation normal.   Cardiovascular:      Rate and Rhythm: Normal rate and regular rhythm.      Pulses: Normal pulses.      Heart sounds: Normal heart sounds.   Pulmonary:      Effort: Pulmonary effort is normal.      Breath sounds: Normal breath sounds.   Musculoskeletal:      Cervical back: Full passive range of motion without pain, normal range of motion and neck supple. No muscular tenderness. Normal range of motion.   Lymphadenopathy:      Cervical: No cervical adenopathy.      Upper Body:      Right upper body: No supraclavicular adenopathy.      Left upper body: No supraclavicular adenopathy.   Skin:     General: Skin is warm and dry.      Capillary Refill: Capillary refill takes less than 2 seconds.   Neurological:      Mental Status: He is alert and oriented to person, place, and time.   Psychiatric:    "      Mood and Affect: Mood normal.         Behavior: Behavior normal. Behavior is cooperative.         Thought Content: Thought content normal.     Results for orders placed or performed in visit on 11/16/22   POCT SARS-COV Antigen ANDREA (Symptomatic only)   Result Value Ref Range    Internal  Valid     SARS-COV ANTIGEN ANDREA Negative Negative, Indeterminate, None Detected, Not Detected, Detected, NotDetected, Valid, Invalid, Pass    Internal Control Positive Valid     Internal Control Negative Valid          Assessment /Associated Orders:      1. Upper respiratory infection, acute  POCT SARS-COV Antigen ANDREA (Symptomatic only)      2. Sore throat        3. Nasal congestion        4. Acute cough            Medical Decision Making:    Pt is clinically stable at today's acute urgent care visit.  No acute distress noted. Appropriate for outpatient care at this time.   Acute problem today .   Covid antigen:   negative  Discussed that this illness was viral in nature. Did not see any evidence of a bacterial process. OTC medications can be used for symptomatic relief of symptoms. Follow manufacturers guidelines for dosing and instructions.   OTC anti-tussive medication of choice to help cough. Dosage and directions per .   OTC antihistamine of choice. Follow manufactures dosing and safety guidelines.    Humidifier at night prn   Salt water gargles BID and prn. Suggested 1/4 to 1/2 teaspoon (1.5 to 3.0 g) of salt per one cup (8 ounces or 250 mL) of warm water.   OTC throat analgesic spray or lozenge of choice prn throat pain. Dosage and directions per     Discussed Dx, management options (risks,benefits, and alternatives to planned treatment), natural progression and supportive care.  Expressed understanding and the treatment plan was agreed upon.   Questions were encouraged and answered   Return to urgent care prn if new or worsening sx or if there is no improvement in condition prn.     Educated in Red flags and indications to immediately call 911 or present to the Emergency Department.       Time I spent evaluating Jaquan Fernandez in urgent care today was 32  minutes. This time includes preparing for visit, reviewing any pertinent notes or test results, counseling/education, exam, obtaining HPI, interpretation of lab tests, medication management and documentation as indicated above.Time does not include separately billable procedures noted .       Please note that this dictation was created using voice recognition software. I have worked with consultants from the vendor as well as technical experts from [facility] to optimize the interface. I have made every reasonable attempt to correct obvious errors, but I expect that there are errors of grammar and possibly content that I did not discover before finalizing the note.  This note was electronically signed by provider

## 2023-01-12 ENCOUNTER — PATIENT MESSAGE (OUTPATIENT)
Dept: MEDICAL GROUP | Facility: LAB | Age: 53
End: 2023-01-12
Payer: COMMERCIAL

## 2023-01-12 DIAGNOSIS — Z00.00 WELL ADULT EXAM: ICD-10-CM

## 2023-01-12 DIAGNOSIS — M1A.0710 IDIOPATHIC CHRONIC GOUT OF RIGHT ANKLE WITHOUT TOPHUS: ICD-10-CM

## 2023-01-20 ENCOUNTER — HOSPITAL ENCOUNTER (OUTPATIENT)
Dept: LAB | Facility: MEDICAL CENTER | Age: 53
End: 2023-01-20
Attending: FAMILY MEDICINE
Payer: COMMERCIAL

## 2023-01-20 DIAGNOSIS — M1A.0710 IDIOPATHIC CHRONIC GOUT OF RIGHT ANKLE WITHOUT TOPHUS: ICD-10-CM

## 2023-01-20 DIAGNOSIS — Z00.00 WELL ADULT EXAM: ICD-10-CM

## 2023-01-20 LAB
ALBUMIN SERPL BCP-MCNC: 4.5 G/DL (ref 3.2–4.9)
ALBUMIN/GLOB SERPL: 1.6 G/DL
ALP SERPL-CCNC: 78 U/L (ref 30–99)
ALT SERPL-CCNC: 12 U/L (ref 2–50)
ANION GAP SERPL CALC-SCNC: 8 MMOL/L (ref 7–16)
AST SERPL-CCNC: 14 U/L (ref 12–45)
BASOPHILS # BLD AUTO: 1.5 % (ref 0–1.8)
BASOPHILS # BLD: 0.08 K/UL (ref 0–0.12)
BILIRUB SERPL-MCNC: 0.3 MG/DL (ref 0.1–1.5)
BUN SERPL-MCNC: 16 MG/DL (ref 8–22)
CALCIUM ALBUM COR SERPL-MCNC: 9.1 MG/DL (ref 8.5–10.5)
CALCIUM SERPL-MCNC: 9.5 MG/DL (ref 8.5–10.5)
CHLORIDE SERPL-SCNC: 102 MMOL/L (ref 96–112)
CHOLEST SERPL-MCNC: 191 MG/DL (ref 100–199)
CO2 SERPL-SCNC: 29 MMOL/L (ref 20–33)
CREAT SERPL-MCNC: 0.78 MG/DL (ref 0.5–1.4)
EOSINOPHIL # BLD AUTO: 0.11 K/UL (ref 0–0.51)
EOSINOPHIL NFR BLD: 2 % (ref 0–6.9)
ERYTHROCYTE [DISTWIDTH] IN BLOOD BY AUTOMATED COUNT: 45.7 FL (ref 35.9–50)
FASTING STATUS PATIENT QL REPORTED: NORMAL
GFR SERPLBLD CREATININE-BSD FMLA CKD-EPI: 106 ML/MIN/1.73 M 2
GLOBULIN SER CALC-MCNC: 2.8 G/DL (ref 1.9–3.5)
GLUCOSE SERPL-MCNC: 86 MG/DL (ref 65–99)
HCT VFR BLD AUTO: 44.6 % (ref 42–52)
HDLC SERPL-MCNC: 57 MG/DL
HGB BLD-MCNC: 14.3 G/DL (ref 14–18)
IMM GRANULOCYTES # BLD AUTO: 0.01 K/UL (ref 0–0.11)
IMM GRANULOCYTES NFR BLD AUTO: 0.2 % (ref 0–0.9)
LDLC SERPL CALC-MCNC: 123 MG/DL
LYMPHOCYTES # BLD AUTO: 2.66 K/UL (ref 1–4.8)
LYMPHOCYTES NFR BLD: 48.8 % (ref 22–41)
MCH RBC QN AUTO: 30.2 PG (ref 27–33)
MCHC RBC AUTO-ENTMCNC: 32.1 G/DL (ref 33.7–35.3)
MCV RBC AUTO: 94.3 FL (ref 81.4–97.8)
MONOCYTES # BLD AUTO: 0.37 K/UL (ref 0–0.85)
MONOCYTES NFR BLD AUTO: 6.8 % (ref 0–13.4)
NEUTROPHILS # BLD AUTO: 2.22 K/UL (ref 1.82–7.42)
NEUTROPHILS NFR BLD: 40.7 % (ref 44–72)
NRBC # BLD AUTO: 0 K/UL
NRBC BLD-RTO: 0 /100 WBC
PLATELET # BLD AUTO: 462 K/UL (ref 164–446)
PMV BLD AUTO: 9.3 FL (ref 9–12.9)
POTASSIUM SERPL-SCNC: 4.5 MMOL/L (ref 3.6–5.5)
PROT SERPL-MCNC: 7.3 G/DL (ref 6–8.2)
RBC # BLD AUTO: 4.73 M/UL (ref 4.7–6.1)
SODIUM SERPL-SCNC: 139 MMOL/L (ref 135–145)
TRIGL SERPL-MCNC: 53 MG/DL (ref 0–149)
URATE SERPL-MCNC: 7.8 MG/DL (ref 2.5–8.3)
WBC # BLD AUTO: 5.5 K/UL (ref 4.8–10.8)

## 2023-01-20 PROCEDURE — 80061 LIPID PANEL: CPT

## 2023-01-20 PROCEDURE — 80053 COMPREHEN METABOLIC PANEL: CPT

## 2023-01-20 PROCEDURE — 84550 ASSAY OF BLOOD/URIC ACID: CPT

## 2023-01-20 PROCEDURE — 85025 COMPLETE CBC W/AUTO DIFF WBC: CPT

## 2023-01-20 PROCEDURE — 36415 COLL VENOUS BLD VENIPUNCTURE: CPT

## 2023-01-24 DIAGNOSIS — D75.839 THROMBOCYTOSIS: ICD-10-CM

## 2023-01-26 ENCOUNTER — OFFICE VISIT (OUTPATIENT)
Dept: MEDICAL GROUP | Facility: LAB | Age: 53
End: 2023-01-26
Payer: COMMERCIAL

## 2023-01-26 VITALS
WEIGHT: 193 LBS | HEIGHT: 73 IN | DIASTOLIC BLOOD PRESSURE: 76 MMHG | SYSTOLIC BLOOD PRESSURE: 118 MMHG | BODY MASS INDEX: 25.58 KG/M2 | RESPIRATION RATE: 16 BRPM | OXYGEN SATURATION: 98 % | HEART RATE: 64 BPM | TEMPERATURE: 97.2 F

## 2023-01-26 DIAGNOSIS — H93.13 TINNITUS OF BOTH EARS: ICD-10-CM

## 2023-01-26 DIAGNOSIS — Z12.5 SCREENING FOR PROSTATE CANCER: ICD-10-CM

## 2023-01-26 DIAGNOSIS — H81.10 BENIGN RECURRENT VERTIGO: ICD-10-CM

## 2023-01-26 DIAGNOSIS — M1A.0710 IDIOPATHIC CHRONIC GOUT OF RIGHT ANKLE WITHOUT TOPHUS: ICD-10-CM

## 2023-01-26 DIAGNOSIS — Z00.00 WELL ADULT EXAM: ICD-10-CM

## 2023-01-26 PROCEDURE — 99396 PREV VISIT EST AGE 40-64: CPT | Performed by: FAMILY MEDICINE

## 2023-01-26 RX ORDER — COLCHICINE 0.6 MG/1
TABLET ORAL
Qty: 6 TABLET | Refills: 1 | Status: SHIPPED | OUTPATIENT
Start: 2023-01-26

## 2023-01-26 RX ORDER — ALLOPURINOL 100 MG/1
100 TABLET ORAL DAILY
Qty: 90 TABLET | Refills: 1 | Status: SHIPPED | OUTPATIENT
Start: 2023-01-26 | End: 2023-03-17

## 2023-01-26 ASSESSMENT — FIBROSIS 4 INDEX: FIB4 SCORE: 0.46

## 2023-01-26 ASSESSMENT — PATIENT HEALTH QUESTIONNAIRE - PHQ9: CLINICAL INTERPRETATION OF PHQ2 SCORE: 0

## 2023-03-17 ENCOUNTER — HOSPITAL ENCOUNTER (OUTPATIENT)
Dept: LAB | Facility: MEDICAL CENTER | Age: 53
End: 2023-03-17
Attending: FAMILY MEDICINE
Payer: COMMERCIAL

## 2023-03-17 DIAGNOSIS — M1A.0710 IDIOPATHIC CHRONIC GOUT OF RIGHT ANKLE WITHOUT TOPHUS: ICD-10-CM

## 2023-03-17 DIAGNOSIS — D75.839 THROMBOCYTOSIS: ICD-10-CM

## 2023-03-17 DIAGNOSIS — Z12.5 SCREENING FOR PROSTATE CANCER: ICD-10-CM

## 2023-03-17 LAB
BASOPHILS # BLD AUTO: 0.9 % (ref 0–1.8)
BASOPHILS # BLD: 0.07 K/UL (ref 0–0.12)
EOSINOPHIL # BLD AUTO: 0.08 K/UL (ref 0–0.51)
EOSINOPHIL NFR BLD: 1.1 % (ref 0–6.9)
ERYTHROCYTE [DISTWIDTH] IN BLOOD BY AUTOMATED COUNT: 48.3 FL (ref 35.9–50)
HCT VFR BLD AUTO: 48.2 % (ref 42–52)
HGB BLD-MCNC: 15.9 G/DL (ref 14–18)
IMM GRANULOCYTES # BLD AUTO: 0.01 K/UL (ref 0–0.11)
IMM GRANULOCYTES NFR BLD AUTO: 0.1 % (ref 0–0.9)
LYMPHOCYTES # BLD AUTO: 2.77 K/UL (ref 1–4.8)
LYMPHOCYTES NFR BLD: 37.2 % (ref 22–41)
MCH RBC QN AUTO: 30.3 PG (ref 27–33)
MCHC RBC AUTO-ENTMCNC: 33 G/DL (ref 33.7–35.3)
MCV RBC AUTO: 91.8 FL (ref 81.4–97.8)
MONOCYTES # BLD AUTO: 0.6 K/UL (ref 0–0.85)
MONOCYTES NFR BLD AUTO: 8.1 % (ref 0–13.4)
NEUTROPHILS # BLD AUTO: 3.91 K/UL (ref 1.82–7.42)
NEUTROPHILS NFR BLD: 52.6 % (ref 44–72)
NRBC # BLD AUTO: 0.03 K/UL
NRBC BLD-RTO: 0.4 /100 WBC
PLATELET # BLD AUTO: 313 K/UL (ref 164–446)
PMV BLD AUTO: 9.8 FL (ref 9–12.9)
PSA SERPL-MCNC: 0.43 NG/ML (ref 0–4)
RBC # BLD AUTO: 5.25 M/UL (ref 4.7–6.1)
URATE SERPL-MCNC: 8.2 MG/DL (ref 2.5–8.3)
WBC # BLD AUTO: 7.4 K/UL (ref 4.8–10.8)

## 2023-03-17 PROCEDURE — 84550 ASSAY OF BLOOD/URIC ACID: CPT

## 2023-03-17 PROCEDURE — 85025 COMPLETE CBC W/AUTO DIFF WBC: CPT

## 2023-03-17 PROCEDURE — 36415 COLL VENOUS BLD VENIPUNCTURE: CPT

## 2023-03-17 PROCEDURE — 84153 ASSAY OF PSA TOTAL: CPT

## 2023-03-17 RX ORDER — ALLOPURINOL 300 MG/1
300 TABLET ORAL DAILY
Qty: 90 TABLET | Refills: 3 | Status: SHIPPED | OUTPATIENT
Start: 2023-03-17

## 2023-04-20 ENCOUNTER — APPOINTMENT (RX ONLY)
Dept: URBAN - METROPOLITAN AREA CLINIC 15 | Facility: CLINIC | Age: 53
Setting detail: DERMATOLOGY
End: 2023-04-20

## 2023-04-20 DIAGNOSIS — L81.4 OTHER MELANIN HYPERPIGMENTATION: ICD-10-CM

## 2023-04-20 DIAGNOSIS — L91.8 OTHER HYPERTROPHIC DISORDERS OF THE SKIN: ICD-10-CM

## 2023-04-20 DIAGNOSIS — L82.1 OTHER SEBORRHEIC KERATOSIS: ICD-10-CM

## 2023-04-20 DIAGNOSIS — D18.0 HEMANGIOMA: ICD-10-CM

## 2023-04-20 DIAGNOSIS — D22 MELANOCYTIC NEVI: ICD-10-CM

## 2023-04-20 PROBLEM — D22.62 MELANOCYTIC NEVI OF LEFT UPPER LIMB, INCLUDING SHOULDER: Status: ACTIVE | Noted: 2023-04-20

## 2023-04-20 PROBLEM — D18.01 HEMANGIOMA OF SKIN AND SUBCUTANEOUS TISSUE: Status: ACTIVE | Noted: 2023-04-20

## 2023-04-20 PROBLEM — D22.5 MELANOCYTIC NEVI OF TRUNK: Status: ACTIVE | Noted: 2023-04-20

## 2023-04-20 PROBLEM — D22.72 MELANOCYTIC NEVI OF LEFT LOWER LIMB, INCLUDING HIP: Status: ACTIVE | Noted: 2023-04-20

## 2023-04-20 PROBLEM — D22.61 MELANOCYTIC NEVI OF RIGHT UPPER LIMB, INCLUDING SHOULDER: Status: ACTIVE | Noted: 2023-04-20

## 2023-04-20 PROBLEM — D22.71 MELANOCYTIC NEVI OF RIGHT LOWER LIMB, INCLUDING HIP: Status: ACTIVE | Noted: 2023-04-20

## 2023-04-20 PROCEDURE — 99213 OFFICE O/P EST LOW 20 MIN: CPT

## 2023-04-20 PROCEDURE — ? COUNSELING

## 2023-04-20 ASSESSMENT — LOCATION SIMPLE DESCRIPTION DERM
LOCATION SIMPLE: LEFT CALF
LOCATION SIMPLE: LEFT LOWER BACK
LOCATION SIMPLE: ABDOMEN
LOCATION SIMPLE: LEFT EAR
LOCATION SIMPLE: LEFT POPLITEAL SKIN
LOCATION SIMPLE: RIGHT EAR
LOCATION SIMPLE: SCALP
LOCATION SIMPLE: LEFT UPPER ARM
LOCATION SIMPLE: RIGHT POSTERIOR THIGH
LOCATION SIMPLE: LEFT FOREHEAD
LOCATION SIMPLE: LEFT FOREARM
LOCATION SIMPLE: LEFT POSTERIOR THIGH
LOCATION SIMPLE: RIGHT AXILLARY VAULT
LOCATION SIMPLE: RIGHT FOREARM
LOCATION SIMPLE: RIGHT HAND
LOCATION SIMPLE: CHEST
LOCATION SIMPLE: LEFT FOREARM
LOCATION SIMPLE: RIGHT UPPER ARM
LOCATION SIMPLE: RIGHT UPPER BACK
LOCATION SIMPLE: RIGHT POPLITEAL SKIN
LOCATION SIMPLE: RIGHT LOWER BACK
LOCATION SIMPLE: RIGHT CALF
LOCATION SIMPLE: LEFT HAND
LOCATION SIMPLE: LEFT ELBOW

## 2023-04-20 ASSESSMENT — LOCATION DETAILED DESCRIPTION DERM
LOCATION DETAILED: RIGHT PROXIMAL CALF
LOCATION DETAILED: RIGHT CENTRAL FRONTAL SCALP
LOCATION DETAILED: LEFT DISTAL POSTERIOR THIGH
LOCATION DETAILED: XIPHOID
LOCATION DETAILED: PERIUMBILICAL SKIN
LOCATION DETAILED: LEFT SUPERIOR MEDIAL MIDBACK
LOCATION DETAILED: LEFT RADIAL DORSAL HAND
LOCATION DETAILED: RIGHT POPLITEAL SKIN
LOCATION DETAILED: LEFT MEDIAL FOREHEAD
LOCATION DETAILED: LEFT ANTECUBITAL SKIN
LOCATION DETAILED: LEFT SCAPHA
LOCATION DETAILED: LEFT VENTRAL DISTAL FOREARM
LOCATION DETAILED: EPIGASTRIC SKIN
LOCATION DETAILED: RIGHT ANTERIOR DISTAL UPPER ARM
LOCATION DETAILED: LEFT VENTRAL DISTAL FOREARM
LOCATION DETAILED: RIGHT MEDIAL UPPER BACK
LOCATION DETAILED: LEFT POPLITEAL SKIN
LOCATION DETAILED: RIGHT AXILLARY VAULT
LOCATION DETAILED: RIGHT INFERIOR UPPER BACK
LOCATION DETAILED: LEFT ANTERIOR DISTAL UPPER ARM
LOCATION DETAILED: RIGHT DISTAL POSTERIOR THIGH
LOCATION DETAILED: RIGHT VENTRAL PROXIMAL FOREARM
LOCATION DETAILED: LEFT PROXIMAL CALF
LOCATION DETAILED: RIGHT RADIAL DORSAL HAND
LOCATION DETAILED: RIGHT INFERIOR MEDIAL MIDBACK
LOCATION DETAILED: RIGHT VENTRAL DISTAL FOREARM
LOCATION DETAILED: RIGHT ANTERIOR PROXIMAL UPPER ARM
LOCATION DETAILED: RIGHT MEDIAL INFERIOR CHEST
LOCATION DETAILED: RIGHT SUPERIOR HELIX
LOCATION DETAILED: RIGHT SUPERIOR PARIETAL SCALP

## 2023-04-20 ASSESSMENT — LOCATION ZONE DERM
LOCATION ZONE: TRUNK
LOCATION ZONE: SCALP
LOCATION ZONE: ARM
LOCATION ZONE: AXILLAE
LOCATION ZONE: ARM
LOCATION ZONE: HAND
LOCATION ZONE: EAR
LOCATION ZONE: FACE
LOCATION ZONE: LEG

## 2024-04-25 ENCOUNTER — APPOINTMENT (RX ONLY)
Dept: URBAN - METROPOLITAN AREA CLINIC 15 | Facility: CLINIC | Age: 54
Setting detail: DERMATOLOGY
End: 2024-04-25

## 2024-04-25 DIAGNOSIS — D22 MELANOCYTIC NEVI: ICD-10-CM

## 2024-04-25 DIAGNOSIS — L91.8 OTHER HYPERTROPHIC DISORDERS OF THE SKIN: ICD-10-CM

## 2024-04-25 DIAGNOSIS — D18.0 HEMANGIOMA: ICD-10-CM

## 2024-04-25 DIAGNOSIS — L82.1 OTHER SEBORRHEIC KERATOSIS: ICD-10-CM

## 2024-04-25 DIAGNOSIS — L81.4 OTHER MELANIN HYPERPIGMENTATION: ICD-10-CM

## 2024-04-25 PROBLEM — D22.71 MELANOCYTIC NEVI OF RIGHT LOWER LIMB, INCLUDING HIP: Status: ACTIVE | Noted: 2024-04-25

## 2024-04-25 PROBLEM — D18.01 HEMANGIOMA OF SKIN AND SUBCUTANEOUS TISSUE: Status: ACTIVE | Noted: 2024-04-25

## 2024-04-25 PROBLEM — D22.62 MELANOCYTIC NEVI OF LEFT UPPER LIMB, INCLUDING SHOULDER: Status: ACTIVE | Noted: 2024-04-25

## 2024-04-25 PROBLEM — D22.5 MELANOCYTIC NEVI OF TRUNK: Status: ACTIVE | Noted: 2024-04-25

## 2024-04-25 PROBLEM — D22.72 MELANOCYTIC NEVI OF LEFT LOWER LIMB, INCLUDING HIP: Status: ACTIVE | Noted: 2024-04-25

## 2024-04-25 PROBLEM — D22.61 MELANOCYTIC NEVI OF RIGHT UPPER LIMB, INCLUDING SHOULDER: Status: ACTIVE | Noted: 2024-04-25

## 2024-04-25 PROCEDURE — 99213 OFFICE O/P EST LOW 20 MIN: CPT

## 2024-04-25 PROCEDURE — ? COUNSELING

## 2024-04-25 ASSESSMENT — LOCATION DETAILED DESCRIPTION DERM
LOCATION DETAILED: RIGHT INFERIOR CENTRAL MALAR CHEEK
LOCATION DETAILED: RIGHT VENTRAL PROXIMAL FOREARM
LOCATION DETAILED: RIGHT PROXIMAL CALF
LOCATION DETAILED: LEFT VENTRAL DISTAL FOREARM
LOCATION DETAILED: LEFT POSTERIOR SHOULDER
LOCATION DETAILED: RIGHT PROXIMAL DORSAL FOREARM
LOCATION DETAILED: RIGHT VENTRAL DISTAL FOREARM
LOCATION DETAILED: LEFT MEDIAL SUPERIOR CHEST
LOCATION DETAILED: LEFT VENTRAL PROXIMAL FOREARM
LOCATION DETAILED: RIGHT DISTAL POSTERIOR UPPER ARM
LOCATION DETAILED: RIGHT DISTAL POSTERIOR THIGH
LOCATION DETAILED: LEFT PROXIMAL DORSAL FOREARM
LOCATION DETAILED: LEFT ANTERIOR DISTAL THIGH
LOCATION DETAILED: LEFT INFERIOR CENTRAL MALAR CHEEK
LOCATION DETAILED: LEFT PROXIMAL CALF
LOCATION DETAILED: RIGHT LATERAL EYEBROW
LOCATION DETAILED: RIGHT LATERAL SUPERIOR CHEST
LOCATION DETAILED: RIGHT LATERAL INFERIOR EYELID
LOCATION DETAILED: RIGHT ANTERIOR DISTAL THIGH
LOCATION DETAILED: LEFT MEDIAL ZYGOMA
LOCATION DETAILED: LEFT LATERAL SUPERIOR CHEST
LOCATION DETAILED: LEFT DISTAL POSTERIOR THIGH
LOCATION DETAILED: RIGHT POSTERIOR SHOULDER
LOCATION DETAILED: LEFT PROXIMAL POSTERIOR UPPER ARM
LOCATION DETAILED: LEFT LATERAL TRAPEZIAL NECK
LOCATION DETAILED: RIGHT MEDIAL INFERIOR CHEST
LOCATION DETAILED: LEFT SUPERIOR LATERAL UPPER BACK
LOCATION DETAILED: LEFT LATERAL INFERIOR EYELID
LOCATION DETAILED: LEFT LATERAL EYEBROW

## 2024-04-25 ASSESSMENT — LOCATION SIMPLE DESCRIPTION DERM
LOCATION SIMPLE: LEFT UPPER ARM
LOCATION SIMPLE: LEFT THIGH
LOCATION SIMPLE: LEFT SHOULDER
LOCATION SIMPLE: RIGHT FOREARM
LOCATION SIMPLE: LEFT CHEEK
LOCATION SIMPLE: LEFT ZYGOMA
LOCATION SIMPLE: RIGHT SHOULDER
LOCATION SIMPLE: LEFT INFERIOR EYELID
LOCATION SIMPLE: RIGHT UPPER ARM
LOCATION SIMPLE: LEFT FOREARM
LOCATION SIMPLE: RIGHT EYEBROW
LOCATION SIMPLE: RIGHT CALF
LOCATION SIMPLE: LEFT POSTERIOR THIGH
LOCATION SIMPLE: LEFT UPPER BACK
LOCATION SIMPLE: RIGHT CHEEK
LOCATION SIMPLE: RIGHT INFERIOR EYELID
LOCATION SIMPLE: POSTERIOR NECK
LOCATION SIMPLE: CHEST
LOCATION SIMPLE: LEFT CALF
LOCATION SIMPLE: LEFT EYEBROW
LOCATION SIMPLE: RIGHT POSTERIOR THIGH
LOCATION SIMPLE: RIGHT THIGH

## 2024-04-25 ASSESSMENT — LOCATION ZONE DERM
LOCATION ZONE: FACE
LOCATION ZONE: TRUNK
LOCATION ZONE: EYELID
LOCATION ZONE: NECK
LOCATION ZONE: ARM
LOCATION ZONE: LEG

## 2025-01-14 ENCOUNTER — OFFICE VISIT (OUTPATIENT)
Dept: MEDICAL GROUP | Facility: LAB | Age: 55
End: 2025-01-14
Payer: COMMERCIAL

## 2025-01-14 VITALS
HEART RATE: 61 BPM | RESPIRATION RATE: 14 BRPM | HEIGHT: 73 IN | WEIGHT: 210.4 LBS | DIASTOLIC BLOOD PRESSURE: 80 MMHG | TEMPERATURE: 96.5 F | BODY MASS INDEX: 27.89 KG/M2 | SYSTOLIC BLOOD PRESSURE: 130 MMHG | OXYGEN SATURATION: 95 %

## 2025-01-14 DIAGNOSIS — Z00.00 WELL ADULT EXAM: ICD-10-CM

## 2025-01-14 DIAGNOSIS — Z72.89 OTHER PROBLEMS RELATED TO LIFESTYLE: ICD-10-CM

## 2025-01-14 DIAGNOSIS — Z12.5 SCREENING FOR PROSTATE CANCER: ICD-10-CM

## 2025-01-14 DIAGNOSIS — M1A.0720 IDIOPATHIC CHRONIC GOUT OF LEFT ANKLE WITHOUT TOPHUS: ICD-10-CM

## 2025-01-14 DIAGNOSIS — Z11.4 SCREENING FOR HIV WITHOUT PRESENCE OF RISK FACTORS: ICD-10-CM

## 2025-01-14 PROCEDURE — 3075F SYST BP GE 130 - 139MM HG: CPT | Performed by: FAMILY MEDICINE

## 2025-01-14 PROCEDURE — 3079F DIAST BP 80-89 MM HG: CPT | Performed by: FAMILY MEDICINE

## 2025-01-14 PROCEDURE — 99214 OFFICE O/P EST MOD 30 MIN: CPT | Performed by: FAMILY MEDICINE

## 2025-01-14 RX ORDER — PREDNISONE 10 MG/1
TABLET ORAL
Qty: 39 TABLET | Refills: 0 | Status: SHIPPED | OUTPATIENT
Start: 2025-01-14 | End: 2025-01-26

## 2025-01-14 ASSESSMENT — FIBROSIS 4 INDEX: FIB4 SCORE: 0.71

## 2025-01-14 ASSESSMENT — PATIENT HEALTH QUESTIONNAIRE - PHQ9: CLINICAL INTERPRETATION OF PHQ2 SCORE: 0

## 2025-01-14 NOTE — PROGRESS NOTES
"Subjective:     CC: Gout    HPI:   Jaquan presents today with parent for gout flare.  Has had pain and swelling consistent with possible flare to the left foot and ankle for the last 5 weeks.  No preceding injury or trauma.  Has history of previous severe flares but had not had gout flare for about 2 years.  Tried colchicine a few weeks and but this did not seem to help much.  Has otherwise not tried specific treatment.      Current Outpatient Medications   Medication Sig Dispense Refill    ibuprofen (MOTRIN) 200 MG Tab Take 400 mg by mouth 1 time a day as needed.      allopurinol (ZYLOPRIM) 300 MG Tab Take 1 Tablet by mouth every day. (Patient not taking: Reported on 1/14/2025) 90 Tablet 3    colchicine (COLCRYS) 0.6 MG Tab Take 1.2 mg by mouth at the first sign of flare, followed by 0.6 mg after 1 hour (Patient not taking: Reported on 1/14/2025) 6 Tablet 1     No current facility-administered medications for this visit.       Medications, past medical history, allergies, and social history have been reviewed and updated.      Objective:       Exam:  /80 (BP Location: Left arm, Patient Position: Sitting, BP Cuff Size: Adult)   Pulse 61   Temp 35.8 °C (96.5 °F) (Temporal)   Resp 14   Ht 1.854 m (6' 1\")   Wt 95.4 kg (210 lb 6.4 oz)   SpO2 95%   BMI 27.76 kg/m²  Body mass index is 27.76 kg/m².    Constitutional: Alert. Well appearing. No distress.  Skin: Warm, dry, good turgor, no visible rashes.  Respiratory: Normal effort.   MSK: Diffuse swelling and mild tenderness surrounding the left ankle and dorsal midfoot.  Psych: Answers questions appropriately. Normal affect and mood.      Assessment & Plan:     54 y.o. male with the following -     1. Idiopathic chronic gout of left ankle without tophus  Severe flare to the left ankle/foot over the last 5 weeks.  Never ended up starting allopurinol, tried colchicine but not effective.  Given duration and length from initial symptoms do think more prolonged " prednisone course will be needed.  Check labs including uric acid a few weeks after symptoms have resolved with follow-up visit afterwards.  Likely need to consider allopurinol.  Discussed ER precautions.  Symptoms consistent with gout, especially given his history, but we did discuss the concern for other cause including infection if symptoms were to worsen.  - URIC ACID; Future  - predniSONE (DELTASONE) 10 MG Tab; Take 6 Tablets by mouth every day for 3 days, THEN 4 Tablets every day for 3 days, THEN 2 Tablets every day for 3 days, THEN 1 Tablet every day for 3 days.  Dispense: 39 Tablet; Refill: 0    2. Well adult exam  - CBC WITH DIFFERENTIAL; Future  - Comp Metabolic Panel; Future  - Lipid Profile; Future    3. Screening for prostate cancer  - PROSTATE SPECIFIC AG SCREENING; Future    4. Screening for HIV without presence of risk factors  - HIV AG/AB COMBO ASSAY SCREENING; Future    5. Other problems related to lifestyle  - HEP C VIRUS ANTIBODY; Future      Please note that this note was created using voice recognition software.

## 2025-02-05 ENCOUNTER — APPOINTMENT (OUTPATIENT)
Dept: MEDICAL GROUP | Facility: LAB | Age: 55
End: 2025-02-05
Payer: COMMERCIAL

## 2025-02-05 VITALS
SYSTOLIC BLOOD PRESSURE: 118 MMHG | HEART RATE: 58 BPM | TEMPERATURE: 97.3 F | HEIGHT: 73 IN | BODY MASS INDEX: 27.94 KG/M2 | WEIGHT: 210.8 LBS | DIASTOLIC BLOOD PRESSURE: 80 MMHG | OXYGEN SATURATION: 91 % | RESPIRATION RATE: 16 BRPM

## 2025-02-05 DIAGNOSIS — M1A.0720 IDIOPATHIC CHRONIC GOUT OF LEFT ANKLE WITHOUT TOPHUS: ICD-10-CM

## 2025-02-05 PROCEDURE — 99214 OFFICE O/P EST MOD 30 MIN: CPT | Performed by: FAMILY MEDICINE

## 2025-02-05 PROCEDURE — 3079F DIAST BP 80-89 MM HG: CPT | Performed by: FAMILY MEDICINE

## 2025-02-05 PROCEDURE — 3074F SYST BP LT 130 MM HG: CPT | Performed by: FAMILY MEDICINE

## 2025-02-05 RX ORDER — PREDNISONE 10 MG/1
TABLET ORAL
Qty: 39 TABLET | Refills: 0 | Status: SHIPPED | OUTPATIENT
Start: 2025-02-05 | End: 2025-02-17

## 2025-02-05 RX ORDER — ALLOPURINOL 100 MG/1
100 TABLET ORAL DAILY
Qty: 90 TABLET | Refills: 3 | Status: SHIPPED | OUTPATIENT
Start: 2025-02-05

## 2025-02-05 RX ORDER — COLCHICINE 0.6 MG/1
0.6 TABLET ORAL DAILY
Qty: 90 TABLET | Refills: 3 | Status: SHIPPED | OUTPATIENT
Start: 2025-02-05

## 2025-02-05 NOTE — PROGRESS NOTES
"Subjective:     CC: Gout    HPI:   Jaquan presents today to follow-up on gout.  Had severe flare to the left ankle 3 weeks ago which significantly improved with prolonged prednisone course but never completely resolved.  Did worsen after end of prednisone course but recently has slightly improved although still with some pain and swelling.      Current Outpatient Medications   Medication Sig Dispense Refill    ibuprofen (MOTRIN) 200 MG Tab Take 400 mg by mouth 1 time a day as needed.      colchicine (COLCRYS) 0.6 MG Tab Take 1.2 mg by mouth at the first sign of flare, followed by 0.6 mg after 1 hour (Patient not taking: Reported on 2/5/2025) 6 Tablet 1     No current facility-administered medications for this visit.       Medications, past medical history, allergies, and social history have been reviewed and updated.      Objective:       Exam:  /80 (BP Location: Right arm, Patient Position: Sitting, BP Cuff Size: Adult)   Pulse (!) 58   Temp 36.3 °C (97.3 °F) (Temporal)   Resp 16   Ht 1.854 m (6' 1\")   Wt 95.6 kg (210 lb 12.8 oz)   SpO2 91%   BMI 27.81 kg/m²  Body mass index is 27.81 kg/m².    Constitutional: Alert. Well appearing. No distress.  Skin: Warm, dry, good turgor, no visible rashes.  Respiratory: Normal effort.  MSK: Diffuse swelling and mild tenderness surrounding the left ankle and dorsal midfoot.   Psych: Answers questions appropriately. Normal affect and mood.    Assessment & Plan:     54 y.o. male with the following -     1. Idiopathic chronic gout of left ankle without tophus  Severe and persistent flare with partial improvement with prolonged prednisone taper but symptoms recurred afterwards.  Given resistant flare we will go ahead and start allopurinol and colchicine daily while we do another prednisone taper.  Close follow-up scheduled and he will check labs including uric acid levels prior to follow-up.  Suspect will likely need to increase allopurinol dose.  Continue with diet and " lifestyle measures.  - allopurinol (ZYLOPRIM) 100 MG Tab; Take 1 Tablet by mouth every day.  Dispense: 90 Tablet; Refill: 3  - predniSONE (DELTASONE) 10 MG Tab; Take 6 Tablets by mouth every day for 3 days, THEN 4 Tablets every day for 3 days, THEN 2 Tablets every day for 3 days, THEN 1 Tablet every day for 3 days.  Dispense: 39 Tablet; Refill: 0  - colchicine (COLCRYS) 0.6 MG Tab; Take 1 Tablet by mouth every day. Take 1.2 mg by mouth at the first sign of flare, followed by 0.6 mg after 1 hour  Dispense: 90 Tablet; Refill: 3      Please note that this note was created using voice recognition software.

## 2025-02-27 ENCOUNTER — HOSPITAL ENCOUNTER (OUTPATIENT)
Dept: LAB | Facility: MEDICAL CENTER | Age: 55
End: 2025-02-27
Attending: FAMILY MEDICINE
Payer: COMMERCIAL

## 2025-02-27 DIAGNOSIS — Z00.00 WELL ADULT EXAM: ICD-10-CM

## 2025-02-27 DIAGNOSIS — Z12.5 SCREENING FOR PROSTATE CANCER: ICD-10-CM

## 2025-02-27 DIAGNOSIS — Z11.4 SCREENING FOR HIV WITHOUT PRESENCE OF RISK FACTORS: ICD-10-CM

## 2025-02-27 DIAGNOSIS — Z72.89 OTHER PROBLEMS RELATED TO LIFESTYLE: ICD-10-CM

## 2025-02-27 DIAGNOSIS — M1A.0720 IDIOPATHIC CHRONIC GOUT OF LEFT ANKLE WITHOUT TOPHUS: ICD-10-CM

## 2025-02-27 LAB
ALBUMIN SERPL BCP-MCNC: 4.7 G/DL (ref 3.2–4.9)
ALBUMIN/GLOB SERPL: 1.6 G/DL
ALP SERPL-CCNC: 70 U/L (ref 30–99)
ALT SERPL-CCNC: 31 U/L (ref 2–50)
ANION GAP SERPL CALC-SCNC: 13 MMOL/L (ref 7–16)
AST SERPL-CCNC: 38 U/L (ref 12–45)
BASOPHILS # BLD AUTO: 1 % (ref 0–1.8)
BASOPHILS # BLD: 0.07 K/UL (ref 0–0.12)
BILIRUB SERPL-MCNC: 0.5 MG/DL (ref 0.1–1.5)
BUN SERPL-MCNC: 17 MG/DL (ref 8–22)
CALCIUM ALBUM COR SERPL-MCNC: 8.9 MG/DL (ref 8.5–10.5)
CALCIUM SERPL-MCNC: 9.5 MG/DL (ref 8.5–10.5)
CHLORIDE SERPL-SCNC: 102 MMOL/L (ref 96–112)
CHOLEST SERPL-MCNC: 232 MG/DL (ref 100–199)
CO2 SERPL-SCNC: 25 MMOL/L (ref 20–33)
CREAT SERPL-MCNC: 0.92 MG/DL (ref 0.5–1.4)
EOSINOPHIL # BLD AUTO: 0.12 K/UL (ref 0–0.51)
EOSINOPHIL NFR BLD: 1.8 % (ref 0–6.9)
ERYTHROCYTE [DISTWIDTH] IN BLOOD BY AUTOMATED COUNT: 46.3 FL (ref 35.9–50)
GFR SERPLBLD CREATININE-BSD FMLA CKD-EPI: 98 ML/MIN/1.73 M 2
GLOBULIN SER CALC-MCNC: 3 G/DL (ref 1.9–3.5)
GLUCOSE SERPL-MCNC: 76 MG/DL (ref 65–99)
HCT VFR BLD AUTO: 46.3 % (ref 42–52)
HCV AB SER QL: NORMAL
HDLC SERPL-MCNC: 62 MG/DL
HGB BLD-MCNC: 14.9 G/DL (ref 14–18)
HIV 1+2 AB+HIV1 P24 AG SERPL QL IA: NORMAL
IMM GRANULOCYTES # BLD AUTO: 0.01 K/UL (ref 0–0.11)
IMM GRANULOCYTES NFR BLD AUTO: 0.1 % (ref 0–0.9)
LDLC SERPL CALC-MCNC: 153 MG/DL
LYMPHOCYTES # BLD AUTO: 2.49 K/UL (ref 1–4.8)
LYMPHOCYTES NFR BLD: 36.9 % (ref 22–41)
MCH RBC QN AUTO: 30.1 PG (ref 27–33)
MCHC RBC AUTO-ENTMCNC: 32.2 G/DL (ref 32.3–36.5)
MCV RBC AUTO: 93.5 FL (ref 81.4–97.8)
MONOCYTES # BLD AUTO: 0.48 K/UL (ref 0–0.85)
MONOCYTES NFR BLD AUTO: 7.1 % (ref 0–13.4)
NEUTROPHILS # BLD AUTO: 3.57 K/UL (ref 1.82–7.42)
NEUTROPHILS NFR BLD: 53.1 % (ref 44–72)
NRBC # BLD AUTO: 0 K/UL
NRBC BLD-RTO: 0 /100 WBC (ref 0–0.2)
PLATELET # BLD AUTO: 335 K/UL (ref 164–446)
PMV BLD AUTO: 9.9 FL (ref 9–12.9)
POTASSIUM SERPL-SCNC: 3.8 MMOL/L (ref 3.6–5.5)
PROT SERPL-MCNC: 7.7 G/DL (ref 6–8.2)
PSA SERPL DL<=0.01 NG/ML-MCNC: 0.53 NG/ML (ref 0–4)
RBC # BLD AUTO: 4.95 M/UL (ref 4.7–6.1)
SODIUM SERPL-SCNC: 140 MMOL/L (ref 135–145)
TRIGL SERPL-MCNC: 85 MG/DL (ref 0–149)
URATE SERPL-MCNC: 8.3 MG/DL (ref 2.5–8.3)
WBC # BLD AUTO: 6.7 K/UL (ref 4.8–10.8)

## 2025-02-27 PROCEDURE — 80053 COMPREHEN METABOLIC PANEL: CPT

## 2025-02-27 PROCEDURE — 87389 HIV-1 AG W/HIV-1&-2 AB AG IA: CPT

## 2025-02-27 PROCEDURE — 36415 COLL VENOUS BLD VENIPUNCTURE: CPT

## 2025-02-27 PROCEDURE — 86803 HEPATITIS C AB TEST: CPT

## 2025-02-27 PROCEDURE — 85025 COMPLETE CBC W/AUTO DIFF WBC: CPT

## 2025-02-27 PROCEDURE — 84153 ASSAY OF PSA TOTAL: CPT

## 2025-02-27 PROCEDURE — 80061 LIPID PANEL: CPT

## 2025-02-27 PROCEDURE — 84550 ASSAY OF BLOOD/URIC ACID: CPT

## 2025-02-28 ENCOUNTER — RESULTS FOLLOW-UP (OUTPATIENT)
Dept: MEDICAL GROUP | Facility: LAB | Age: 55
End: 2025-02-28

## 2025-03-05 ENCOUNTER — OFFICE VISIT (OUTPATIENT)
Dept: MEDICAL GROUP | Facility: LAB | Age: 55
End: 2025-03-05
Payer: COMMERCIAL

## 2025-03-05 VITALS
HEART RATE: 59 BPM | DIASTOLIC BLOOD PRESSURE: 80 MMHG | TEMPERATURE: 97 F | SYSTOLIC BLOOD PRESSURE: 126 MMHG | BODY MASS INDEX: 28.18 KG/M2 | OXYGEN SATURATION: 96 % | RESPIRATION RATE: 16 BRPM | WEIGHT: 212.6 LBS | HEIGHT: 73 IN

## 2025-03-05 DIAGNOSIS — M1A.0720 IDIOPATHIC CHRONIC GOUT OF LEFT ANKLE WITHOUT TOPHUS: ICD-10-CM

## 2025-03-05 DIAGNOSIS — E78.00 PURE HYPERCHOLESTEROLEMIA: ICD-10-CM

## 2025-03-05 DIAGNOSIS — Z23 NEED FOR VACCINATION: ICD-10-CM

## 2025-03-05 PROCEDURE — 90471 IMMUNIZATION ADMIN: CPT

## 2025-03-05 PROCEDURE — 3079F DIAST BP 80-89 MM HG: CPT | Performed by: FAMILY MEDICINE

## 2025-03-05 PROCEDURE — 90677 PCV20 VACCINE IM: CPT

## 2025-03-05 PROCEDURE — 99214 OFFICE O/P EST MOD 30 MIN: CPT | Mod: 25 | Performed by: FAMILY MEDICINE

## 2025-03-05 PROCEDURE — 3074F SYST BP LT 130 MM HG: CPT | Performed by: FAMILY MEDICINE

## 2025-03-05 RX ORDER — ALLOPURINOL 300 MG/1
300 TABLET ORAL DAILY
Qty: 90 TABLET | Refills: 3 | Status: SHIPPED | OUTPATIENT
Start: 2025-03-05

## 2025-03-05 ASSESSMENT — FIBROSIS 4 INDEX: FIB4 SCORE: 1.12

## 2025-03-05 NOTE — PROGRESS NOTES
"Subjective:     CC: Gout, lab follow up    HPI:   Jaquan presents today to follow-up on gout and lab work.  Previous severe gout flare has basically resolved, still with some occasional swelling to the left ankle but pain resolves.  Uric acid at 8.3, he is taking allopurinol 100 mg daily.  Labs also notable for elevated LDL at 153, increased from prior.    The 10-year ASCVD risk score (Karoline PARRY, et al., 2019) is: 5.3%      Current Outpatient Medications   Medication Sig Dispense Refill    allopurinol (ZYLOPRIM) 100 MG Tab Take 1 Tablet by mouth every day. 90 Tablet 3    colchicine (COLCRYS) 0.6 MG Tab Take 1 Tablet by mouth every day. Take 1.2 mg by mouth at the first sign of flare, followed by 0.6 mg after 1 hour 90 Tablet 3    ibuprofen (MOTRIN) 200 MG Tab Take 400 mg by mouth 1 time a day as needed.       No current facility-administered medications for this visit.       Medications, past medical history, allergies, and social history have been reviewed and updated.      Objective:       Exam:  /80 (BP Location: Left arm, Patient Position: Sitting, BP Cuff Size: Adult)   Pulse (!) 59   Temp 36.1 °C (97 °F) (Temporal)   Resp 16   Ht 1.854 m (6' 1\")   Wt 96.4 kg (212 lb 9.6 oz)   SpO2 96%   BMI 28.05 kg/m²  Body mass index is 28.05 kg/m².    Constitutional: Alert. Well appearing. No distress.  Skin: Warm, dry, good turgor, no visible rashes.  Respiratory: Normal effort.  Neuro: Moves all four extremities. No facial droop.  Psych: Answers questions appropriately. Normal affect and mood.    Assessment & Plan:     54 y.o. male with the following -     1. Idiopathic chronic gout of left ankle without tophus  Recent flare resolved but uric acid still above goal at 8.3.  Increase allopurinol to 300 mg daily.  Continue daily colchicine to prevent flares given recent severe flare.  Will plan to DC daily colchicine once his uric acid is below 6.  - allopurinol (ZYLOPRIM) 300 MG Tab; Take 1 Tablet by mouth every " day.  Dispense: 90 Tablet; Refill: 3  - URIC ACID; Future    2. Pure hypercholesterolemia  , checking CT cardiac scoring.  Consider statin.  - CT-CARDIAC SCORING; Future    3. Need for vaccination  - Pneumococcal Conjugate Vaccine 20-Valent         Please note that this note was created using voice recognition software.

## 2025-04-24 ENCOUNTER — HOSPITAL ENCOUNTER (OUTPATIENT)
Dept: LAB | Facility: MEDICAL CENTER | Age: 55
End: 2025-04-24
Attending: FAMILY MEDICINE
Payer: COMMERCIAL

## 2025-04-24 DIAGNOSIS — M1A.0720 IDIOPATHIC CHRONIC GOUT OF LEFT ANKLE WITHOUT TOPHUS: ICD-10-CM

## 2025-04-24 LAB — URATE SERPL-MCNC: 6.4 MG/DL (ref 2.5–8.3)

## 2025-04-24 PROCEDURE — 36415 COLL VENOUS BLD VENIPUNCTURE: CPT

## 2025-04-24 PROCEDURE — 84550 ASSAY OF BLOOD/URIC ACID: CPT

## 2025-04-25 ENCOUNTER — APPOINTMENT (OUTPATIENT)
Dept: URBAN - METROPOLITAN AREA CLINIC 15 | Facility: CLINIC | Age: 55
Setting detail: DERMATOLOGY
End: 2025-04-25

## 2025-04-25 ENCOUNTER — RESULTS FOLLOW-UP (OUTPATIENT)
Dept: MEDICAL GROUP | Facility: LAB | Age: 55
End: 2025-04-25
Payer: COMMERCIAL

## 2025-04-25 DIAGNOSIS — L81.4 OTHER MELANIN HYPERPIGMENTATION: ICD-10-CM

## 2025-04-25 DIAGNOSIS — L91.8 OTHER HYPERTROPHIC DISORDERS OF THE SKIN: ICD-10-CM

## 2025-04-25 DIAGNOSIS — L82.1 OTHER SEBORRHEIC KERATOSIS: ICD-10-CM

## 2025-04-25 DIAGNOSIS — D18.0 HEMANGIOMA: ICD-10-CM

## 2025-04-25 DIAGNOSIS — D22 MELANOCYTIC NEVI: ICD-10-CM

## 2025-04-25 PROBLEM — D22.71 MELANOCYTIC NEVI OF RIGHT LOWER LIMB, INCLUDING HIP: Status: ACTIVE | Noted: 2025-04-25

## 2025-04-25 PROBLEM — D22.61 MELANOCYTIC NEVI OF RIGHT UPPER LIMB, INCLUDING SHOULDER: Status: ACTIVE | Noted: 2025-04-25

## 2025-04-25 PROBLEM — D22.72 MELANOCYTIC NEVI OF LEFT LOWER LIMB, INCLUDING HIP: Status: ACTIVE | Noted: 2025-04-25

## 2025-04-25 PROBLEM — D18.01 HEMANGIOMA OF SKIN AND SUBCUTANEOUS TISSUE: Status: ACTIVE | Noted: 2025-04-25

## 2025-04-25 PROBLEM — D22.5 MELANOCYTIC NEVI OF TRUNK: Status: ACTIVE | Noted: 2025-04-25

## 2025-04-25 PROBLEM — D22.62 MELANOCYTIC NEVI OF LEFT UPPER LIMB, INCLUDING SHOULDER: Status: ACTIVE | Noted: 2025-04-25

## 2025-04-25 PROCEDURE — ? COUNSELING

## 2025-04-25 PROCEDURE — 99213 OFFICE O/P EST LOW 20 MIN: CPT

## 2025-04-25 ASSESSMENT — LOCATION DETAILED DESCRIPTION DERM
LOCATION DETAILED: LEFT PROXIMAL CALF
LOCATION DETAILED: LEFT INFERIOR CENTRAL MALAR CHEEK
LOCATION DETAILED: LEFT SUPERIOR LATERAL UPPER BACK
LOCATION DETAILED: RIGHT POSTERIOR SHOULDER
LOCATION DETAILED: LEFT MEDIAL ZYGOMA
LOCATION DETAILED: LEFT LATERAL EYEBROW
LOCATION DETAILED: LEFT PROXIMAL POSTERIOR UPPER ARM
LOCATION DETAILED: LEFT VENTRAL DISTAL FOREARM
LOCATION DETAILED: RIGHT PROXIMAL CALF
LOCATION DETAILED: LEFT MEDIAL SUPERIOR CHEST
LOCATION DETAILED: RIGHT DISTAL POSTERIOR THIGH
LOCATION DETAILED: RIGHT AXILLARY VAULT
LOCATION DETAILED: RIGHT LATERAL SUPERIOR CHEST
LOCATION DETAILED: RIGHT VENTRAL DISTAL FOREARM
LOCATION DETAILED: RIGHT VENTRAL PROXIMAL FOREARM
LOCATION DETAILED: RIGHT INFERIOR CENTRAL MALAR CHEEK
LOCATION DETAILED: LEFT SUPERIOR CENTRAL MALAR CHEEK
LOCATION DETAILED: RIGHT PROXIMAL DORSAL FOREARM
LOCATION DETAILED: LEFT VENTRAL PROXIMAL FOREARM
LOCATION DETAILED: LEFT DISTAL POSTERIOR THIGH
LOCATION DETAILED: RIGHT ANTERIOR DISTAL THIGH
LOCATION DETAILED: RIGHT MEDIAL INFERIOR CHEST
LOCATION DETAILED: LEFT PROXIMAL DORSAL FOREARM
LOCATION DETAILED: LEFT LATERAL SUPERIOR CHEST
LOCATION DETAILED: RIGHT SUPERIOR FOREHEAD
LOCATION DETAILED: LEFT POSTERIOR SHOULDER
LOCATION DETAILED: LEFT ANTERIOR DISTAL THIGH
LOCATION DETAILED: RIGHT DISTAL POSTERIOR UPPER ARM

## 2025-04-25 ASSESSMENT — LOCATION SIMPLE DESCRIPTION DERM
LOCATION SIMPLE: LEFT FOREARM
LOCATION SIMPLE: RIGHT CALF
LOCATION SIMPLE: LEFT CHEEK
LOCATION SIMPLE: LEFT POSTERIOR THIGH
LOCATION SIMPLE: RIGHT AXILLARY VAULT
LOCATION SIMPLE: RIGHT THIGH
LOCATION SIMPLE: LEFT EYEBROW
LOCATION SIMPLE: RIGHT CHEEK
LOCATION SIMPLE: RIGHT SHOULDER
LOCATION SIMPLE: LEFT ZYGOMA
LOCATION SIMPLE: RIGHT FOREHEAD
LOCATION SIMPLE: RIGHT FOREARM
LOCATION SIMPLE: LEFT SHOULDER
LOCATION SIMPLE: LEFT THIGH
LOCATION SIMPLE: RIGHT POSTERIOR THIGH
LOCATION SIMPLE: LEFT UPPER BACK
LOCATION SIMPLE: RIGHT UPPER ARM
LOCATION SIMPLE: LEFT CALF
LOCATION SIMPLE: LEFT UPPER ARM
LOCATION SIMPLE: CHEST

## 2025-04-25 ASSESSMENT — LOCATION ZONE DERM
LOCATION ZONE: TRUNK
LOCATION ZONE: AXILLAE
LOCATION ZONE: FACE
LOCATION ZONE: ARM
LOCATION ZONE: LEG